# Patient Record
Sex: FEMALE | Race: WHITE | NOT HISPANIC OR LATINO | URBAN - METROPOLITAN AREA
[De-identification: names, ages, dates, MRNs, and addresses within clinical notes are randomized per-mention and may not be internally consistent; named-entity substitution may affect disease eponyms.]

---

## 2017-07-05 PROBLEM — Z00.00 ENCOUNTER FOR PREVENTIVE HEALTH EXAMINATION: Status: ACTIVE | Noted: 2017-07-05

## 2017-07-21 ENCOUNTER — OUTPATIENT (OUTPATIENT)
Dept: OUTPATIENT SERVICES | Facility: HOSPITAL | Age: 77
LOS: 1 days | End: 2017-07-21
Payer: COMMERCIAL

## 2017-07-21 ENCOUNTER — TRANSCRIPTION ENCOUNTER (OUTPATIENT)
Age: 77
End: 2017-07-21

## 2017-07-21 VITALS
HEART RATE: 77 BPM | WEIGHT: 142.86 LBS | RESPIRATION RATE: 26 BRPM | SYSTOLIC BLOOD PRESSURE: 125 MMHG | OXYGEN SATURATION: 98 % | HEIGHT: 61 IN | TEMPERATURE: 98 F | DIASTOLIC BLOOD PRESSURE: 74 MMHG

## 2017-07-21 VITALS
RESPIRATION RATE: 10 BRPM | HEART RATE: 67 BPM | OXYGEN SATURATION: 100 % | SYSTOLIC BLOOD PRESSURE: 126 MMHG | DIASTOLIC BLOOD PRESSURE: 69 MMHG

## 2017-07-21 DIAGNOSIS — H25.11 AGE-RELATED NUCLEAR CATARACT, RIGHT EYE: ICD-10-CM

## 2017-07-21 DIAGNOSIS — Z90.710 ACQUIRED ABSENCE OF BOTH CERVIX AND UTERUS: Chronic | ICD-10-CM

## 2017-07-21 DIAGNOSIS — C43.59 MALIGNANT MELANOMA OF OTHER PART OF TRUNK: Chronic | ICD-10-CM

## 2017-07-21 PROCEDURE — 66984 XCAPSL CTRC RMVL W/O ECP: CPT | Mod: RT

## 2017-07-21 NOTE — ASU DISCHARGE PLAN (ADULT/PEDIATRIC). - NOTIFY
Pain not relieved by Medications/Fever greater than 101/Swelling that continues/Persistent Nausea and Vomiting/Bleeding that does not stop

## 2018-09-06 ENCOUNTER — TRANSCRIPTION ENCOUNTER (OUTPATIENT)
Age: 78
End: 2018-09-06

## 2018-09-07 ENCOUNTER — OUTPATIENT (OUTPATIENT)
Dept: OUTPATIENT SERVICES | Facility: HOSPITAL | Age: 78
LOS: 1 days | End: 2018-09-07
Payer: COMMERCIAL

## 2018-09-07 VITALS
DIASTOLIC BLOOD PRESSURE: 62 MMHG | SYSTOLIC BLOOD PRESSURE: 127 MMHG | HEART RATE: 74 BPM | RESPIRATION RATE: 18 BRPM | OXYGEN SATURATION: 98 %

## 2018-09-07 VITALS
HEIGHT: 62 IN | HEART RATE: 66 BPM | WEIGHT: 147.71 LBS | SYSTOLIC BLOOD PRESSURE: 148 MMHG | TEMPERATURE: 99 F | DIASTOLIC BLOOD PRESSURE: 81 MMHG | RESPIRATION RATE: 15 BRPM | OXYGEN SATURATION: 98 %

## 2018-09-07 DIAGNOSIS — Z98.49 CATARACT EXTRACTION STATUS, UNSPECIFIED EYE: Chronic | ICD-10-CM

## 2018-09-07 DIAGNOSIS — Z90.710 ACQUIRED ABSENCE OF BOTH CERVIX AND UTERUS: Chronic | ICD-10-CM

## 2018-09-07 DIAGNOSIS — C43.59 MALIGNANT MELANOMA OF OTHER PART OF TRUNK: Chronic | ICD-10-CM

## 2018-09-07 DIAGNOSIS — H25.12 AGE-RELATED NUCLEAR CATARACT, LEFT EYE: ICD-10-CM

## 2018-09-07 PROCEDURE — 66984 XCAPSL CTRC RMVL W/O ECP: CPT | Mod: LT

## 2018-09-07 PROCEDURE — C1780: CPT

## 2019-01-11 NOTE — ASU PREOP CHECKLIST - AS BP NONINV SITE
Avoid refined flours (including wheat products), refined sugars and sodas, and refined vegetable oils. Only consume whole grains like rice, quinoa, or gluten free oats; use honey or stevia for sweetening; and, only use olive oil or coconut oil (extra virgin) for oils. Drink plenty of water. Limit caffeine.     Start a probiotic, OR, eat yogurt daily (ideally plain yogurt with honey as sweetener). Fresh vegetables where you can so you are getting plenty of fiber. Limit fried foods. Avoid junk food and fast food.    Please obtain bloodwork one week prior to returning for office visit.   left upper arm

## 2022-06-16 ENCOUNTER — EMERGENCY (EMERGENCY)
Facility: HOSPITAL | Age: 82
LOS: 1 days | Discharge: HOME | End: 2022-06-16
Attending: EMERGENCY MEDICINE
Payer: COMMERCIAL

## 2022-06-16 VITALS
OXYGEN SATURATION: 94 % | RESPIRATION RATE: 22 BRPM | DIASTOLIC BLOOD PRESSURE: 86 MMHG | SYSTOLIC BLOOD PRESSURE: 133 MMHG | TEMPERATURE: 103 F | HEART RATE: 115 BPM | HEIGHT: 62 IN | WEIGHT: 130.07 LBS

## 2022-06-16 VITALS
RESPIRATION RATE: 18 BRPM | SYSTOLIC BLOOD PRESSURE: 117 MMHG | DIASTOLIC BLOOD PRESSURE: 65 MMHG | TEMPERATURE: 99 F | HEART RATE: 98 BPM | OXYGEN SATURATION: 98 %

## 2022-06-16 DIAGNOSIS — C43.59 MALIGNANT MELANOMA OF OTHER PART OF TRUNK: Chronic | ICD-10-CM

## 2022-06-16 DIAGNOSIS — Z98.49 CATARACT EXTRACTION STATUS, UNSPECIFIED EYE: Chronic | ICD-10-CM

## 2022-06-16 DIAGNOSIS — Z90.710 ACQUIRED ABSENCE OF BOTH CERVIX AND UTERUS: Chronic | ICD-10-CM

## 2022-06-16 LAB
ALBUMIN SERPL ELPH-MCNC: 4.2 G/DL — SIGNIFICANT CHANGE UP (ref 3.3–5)
ALP SERPL-CCNC: 68 U/L — SIGNIFICANT CHANGE UP (ref 40–120)
ALT FLD-CCNC: 35 U/L — SIGNIFICANT CHANGE UP (ref 10–45)
ANION GAP SERPL CALC-SCNC: 13 MMOL/L — SIGNIFICANT CHANGE UP (ref 5–17)
AST SERPL-CCNC: 34 U/L — SIGNIFICANT CHANGE UP (ref 10–40)
BASOPHILS # BLD AUTO: 0 K/UL — SIGNIFICANT CHANGE UP (ref 0–0.2)
BASOPHILS NFR BLD AUTO: 0 % — SIGNIFICANT CHANGE UP (ref 0–2)
BILIRUB SERPL-MCNC: 1.3 MG/DL — HIGH (ref 0.2–1.2)
BUN SERPL-MCNC: 16 MG/DL — SIGNIFICANT CHANGE UP (ref 7–23)
CALCIUM SERPL-MCNC: 8.6 MG/DL — SIGNIFICANT CHANGE UP (ref 8.4–10.5)
CHLORIDE SERPL-SCNC: 99 MMOL/L — SIGNIFICANT CHANGE UP (ref 96–108)
CO2 SERPL-SCNC: 25 MMOL/L — SIGNIFICANT CHANGE UP (ref 22–31)
CREAT SERPL-MCNC: 0.73 MG/DL — SIGNIFICANT CHANGE UP (ref 0.5–1.3)
EGFR: 83 ML/MIN/1.73M2 — SIGNIFICANT CHANGE UP
EOSINOPHIL # BLD AUTO: 0 K/UL — SIGNIFICANT CHANGE UP (ref 0–0.5)
EOSINOPHIL NFR BLD AUTO: 0 % — SIGNIFICANT CHANGE UP (ref 0–6)
GLUCOSE SERPL-MCNC: 123 MG/DL — HIGH (ref 70–99)
HCT VFR BLD CALC: 40.1 % — SIGNIFICANT CHANGE UP (ref 34.5–45)
HGB BLD-MCNC: 13 G/DL — SIGNIFICANT CHANGE UP (ref 11.5–15.5)
LYMPHOCYTES # BLD AUTO: 0.36 K/UL — LOW (ref 1–3.3)
LYMPHOCYTES # BLD AUTO: 3.5 % — LOW (ref 13–44)
MANUAL SMEAR VERIFICATION: SIGNIFICANT CHANGE UP
MCHC RBC-ENTMCNC: 29 PG — SIGNIFICANT CHANGE UP (ref 27–34)
MCHC RBC-ENTMCNC: 32.4 GM/DL — SIGNIFICANT CHANGE UP (ref 32–36)
MCV RBC AUTO: 89.5 FL — SIGNIFICANT CHANGE UP (ref 80–100)
MONOCYTES # BLD AUTO: 0.82 K/UL — SIGNIFICANT CHANGE UP (ref 0–0.9)
MONOCYTES NFR BLD AUTO: 8 % — SIGNIFICANT CHANGE UP (ref 2–14)
NEUTROPHILS # BLD AUTO: 9.03 K/UL — HIGH (ref 1.8–7.4)
NEUTROPHILS NFR BLD AUTO: 73.5 % — SIGNIFICANT CHANGE UP (ref 43–77)
NEUTS BAND # BLD: 15 % — HIGH (ref 0–8)
PLAT MORPH BLD: NORMAL — SIGNIFICANT CHANGE UP
PLATELET # BLD AUTO: 205 K/UL — SIGNIFICANT CHANGE UP (ref 150–400)
POTASSIUM SERPL-MCNC: 4.2 MMOL/L — SIGNIFICANT CHANGE UP (ref 3.5–5.3)
POTASSIUM SERPL-SCNC: 4.2 MMOL/L — SIGNIFICANT CHANGE UP (ref 3.5–5.3)
PROT SERPL-MCNC: 7 G/DL — SIGNIFICANT CHANGE UP (ref 6–8.3)
RBC # BLD: 4.48 M/UL — SIGNIFICANT CHANGE UP (ref 3.8–5.2)
RBC # FLD: 13.4 % — SIGNIFICANT CHANGE UP (ref 10.3–14.5)
RBC BLD AUTO: SIGNIFICANT CHANGE UP
SODIUM SERPL-SCNC: 137 MMOL/L — SIGNIFICANT CHANGE UP (ref 135–145)
WBC # BLD: 10.2 K/UL — SIGNIFICANT CHANGE UP (ref 3.8–10.5)
WBC # FLD AUTO: 10.2 K/UL — SIGNIFICANT CHANGE UP (ref 3.8–10.5)

## 2022-06-16 PROCEDURE — 80053 COMPREHEN METABOLIC PANEL: CPT

## 2022-06-16 PROCEDURE — 85025 COMPLETE CBC W/AUTO DIFF WBC: CPT

## 2022-06-16 PROCEDURE — 99284 EMERGENCY DEPT VISIT MOD MDM: CPT

## 2022-06-16 PROCEDURE — 71045 X-RAY EXAM CHEST 1 VIEW: CPT

## 2022-06-16 PROCEDURE — 99285 EMERGENCY DEPT VISIT HI MDM: CPT | Mod: 25

## 2022-06-16 PROCEDURE — 71045 X-RAY EXAM CHEST 1 VIEW: CPT | Mod: 26

## 2022-06-16 PROCEDURE — U0003: CPT

## 2022-06-16 PROCEDURE — U0005: CPT

## 2022-06-16 RX ORDER — IBUPROFEN 200 MG
400 TABLET ORAL ONCE
Refills: 0 | Status: COMPLETED | OUTPATIENT
Start: 2022-06-16 | End: 2022-06-16

## 2022-06-16 RX ADMIN — Medication 400 MILLIGRAM(S): at 17:56

## 2022-06-16 NOTE — ED ADULT NURSE NOTE - OBJECTIVE STATEMENT
Pt is an 81 y/o female instructed to come to the ED by outpatient provider to r/o pneumonia. Pt is covid+ and c/o cough and weakness,  states her o2 sat at office was 92%. Pt states she feels well, states she does not feel she needs to be in hospital. Denies CP, SOB, N/V/D, numbness, tingling, chills, dizziness, headache. A&Ox3. Breathing unlabored and spontaneous. Abdomen soft, nontender, nondistended. Full ROM and strength of extremities. Skin dry and intact. Safety and comfort measures provided, call bell provided to pt and bed in lowest position.

## 2022-06-16 NOTE — ED PROVIDER NOTE - PHYSICAL EXAMINATION
GENERAL: A&Ox3, non-toxic appearing, no acute distress  HEENT: NCAT, EOMI, oral mucosa moist, normal conjunctiva  RESP: CTAB, no respiratory distress, no wheezes/rhonchi/rales, speaking in full sentences, ambulatory SpO2 95%  CV: RRR, no murmurs/rubs/gallops  ABDOMEN: soft, non-tender, non-distended, no guarding  MSK: no visible deformities  NEURO: no focal sensory or motor deficits, CN 2-12 grossly intact  SKIN: warm, normal color, well perfused, no rash  PSYCH: normal affect

## 2022-06-16 NOTE — ED PROVIDER NOTE - NS ED ROS FT
GENERAL: +fever, no chills  EYES: no change in vision, no irritation, no discharge, no redness, no pain  HEENT: no trouble swallowing or speaking, no throat pain, no ear pain  CARDIAC: no chest pain, no palpitations   PULMONARY: +cough, no shortness of breath, no wheezing  GI: no abdominal pain, no nausea, no vomiting, no diarrhea, no constipation  : no changes in urination, no dysuria  SKIN: no rashes  NEURO: no headache, no numbness, no weakness  MSK: no joint pain, no muscle pain, no back pain, no calf pain

## 2022-06-16 NOTE — ED CLERICAL - NS ED CLERK NOTE PRE-ARRIVAL INFORMATION; ADDITIONAL PRE-ARRIVAL INFORMATION
CC/Reason For referral: Hypoxia, Pneumonia, COVID POS  Preferred Consultant(if applicable): N/A  Who admits for you (if needed): N/A  Do you have documents you would like to fax over? No  Would you still like to speak to an ED attending? Yes, please call Dr. Burris after patient is seen

## 2022-06-16 NOTE — ED ADULT NURSE NOTE - NSSEPSISSUSPECTED_ED_A_ED
Patient aware that per the last note Dr. Reyes was wanting to continue current regimen and recheck BP again in 1 month. Patient going to be needing refill of Lisinopril. Would like sent to mail order pharmacy. This has been done. Patient has no other questions/concerns at this time.   
patient would like call back  She is asking if she is to stay on same dose of lisinopril 10 mg  She about 1 to 2 weeks left   Please call her  
No

## 2022-06-16 NOTE — ED ADULT TRIAGE NOTE - PAIN: PRESENCE, MLM
Bed: ED06  Expected date:   Expected time:   Means of arrival:   Comments:  triage   denies pain/discomfort

## 2022-06-16 NOTE — ED POST DISCHARGE NOTE - ADDITIONAL DOCUMENTATION
Attending MD Andujar: As above, +15% bandemia.  Discussed with patient/family.  Triage/Charge made aware to expedite patient when returns.

## 2022-06-16 NOTE — ED POST DISCHARGE NOTE - DETAILS
spoke to pt and her  - verbalized understanding of lab results, coming back to hospital for abx, cultures, admission.

## 2022-06-16 NOTE — ED PROVIDER NOTE - CLINICAL SUMMARY MEDICAL DECISION MAKING FREE TEXT BOX
Guanaco Tejada, PGY3: 82 year old female p/w cough in the setting of COVID+ today, vaccinated x3. Non-hypoxic on exam, lungs clear, abd soft ntnd, no edema. Will obtain labs and cxr, discuss w/ sending physician, reassess for dispo. Patient would be candidate for Paxlovid or MAB if discharged.

## 2022-06-16 NOTE — ED PROVIDER NOTE - NSFOLLOWUPINSTRUCTIONS_ED_ALL_ED_FT
- Lab and imaging results, if performed, were discussed with you along with your discharge diagnosis    - You will receive a call regarding setting up an appointment for Monoclonal Antibody Infusion if you choose to do so.     - Follow up with your doctor in 1 week - bring copies of your results if you were given. If you do not have a primary doctor, please call [phone] to find one convenient for you    - Return to the ED for any new, worsening, or concerning symptoms to you    - Continue all prescribed medications    - Take ibuprofen/tylenol as directed as needed for pain    - Rest and keep yourself hydrated with fluids       COVID-19 (Coronavirus Disease 2019)    WHAT YOU NEED TO KNOW:    What do I need to know about coronavirus disease 2019 (COVID-19)? COVID-19 is the disease caused by the novel (new) coronavirus first discovered in December 2019. Coronaviruses generally cause upper respiratory (nose, throat, and lung) infections, such as a cold. The new virus can also cause serious lower respiratory conditions, such as pneumonia or acute respiratory distress syndrome (ARDS). Anyone can develop serious problems from the new virus, but your risk is higher if you are 65 or older. A weak immune system, diabetes, or a heart or lung condition can also increase your risk.    What are the signs and symptoms of COVID-19? You may not develop any signs or symptoms. Signs and symptoms that do develop usually start about 5 days after infection but can take 2 to 14 days. Signs and symptoms range from mild to severe. You may feel like you have the flu or a bad cold. Information on COVID-19 is still being learned. Tell your healthcare provider if you think you were infected but develop signs or symptoms not listed below:  •A cough  •Shortness of breath or trouble breathing that may become severe  •A fever of at least 100.4°F, or 38°C (may be lower in adults 65 or older)  •Chills that might include shaking  •Muscle pain, body aches, or a headache  •A sore throat  •Suddenly not being able to taste or smell anything  •Feeling mentally and physically tired (fatigue)  •Congestion (stuffy head and nose), or a runny nose.  •Diarrhea, nausea, or vomiting    How is COVID-19 diagnosed? If you think you have COVID-19, call your healthcare provider. In some areas, testing is only done if a person has severe symptoms or is hospitalized. Testing is done more widely in other places. Your provider will tell you what to do based on your symptoms and the rules in your area. In general, the following may be used:   •A viral test shows if you have a current infection. Samples are taken from your nose and throat, usually with swabs. You may need to wait several days to get the test results. Your healthcare provider will tell you how to get your results. You will need to quarantine (stay physically away from others) until you get your results. If results show you have COVID-19, you will need to quarantine until you are well. Your provider or other health official may give you more directions. You will also need to prevent another infection until it is known if you can get COVID-19 again.  •An antibody test shows if you had a past infection. Blood samples are used for this test. Antibodies are made by your immune system to attack the virus that causes COVID-19. Antibodies will form 1 to 3 weeks after you are infected. It is not known if antibodies prevent a second infection, or for how long a person might be protected. If you have antibodies, you will still need to be careful around others until more is known.  •CT scans or x-rays may be used to check for signs of pneumonia. The 2019 coronavirus causes a specific kind of pneumonia, usually in both lungs.      How is COVID-19 treated? No medicine or specific treatment is currently approved for COVID-19. The following may be used to manage your symptoms or treat the effects of COVID-19:   •Mild symptoms may get better on their own. If you do not need to be treated in a hospital, you will be given instructions to use at home. Your condition will be closely monitored. You will need to watch for worsening symptoms and seek immediate care if needed. Talk to your healthcare provider about the following:?Relieve your symptoms. To soothe a sore throat, gargle with warm salt water, or use throat lozenges or a throat spray. Your healthcare provider may recommend a cough medicine. Drink more liquids to thin and loosen mucus and to prevent dehydration. Use decongestants or saline drops as directed for nasal congestion.  ?NSAIDs or acetaminophen can help lower a fever and relieve body aches or a headache. Follow directions. If not taken correctly, NSAIDs can cause kidney damage and acetaminophen can cause liver damage.    •Severe or life-threatening symptoms are treated in the hospital. You may need a combination of the following:?Medicines may be given to reduce inflammation or to fight the virus. You may also need blood thinners to prevent or treat blood clots. If you have a deep vein thrombosis (DVT) or pulmonary embolism (PE), you may need to keep using blood thinners for 3 months.  ?Extra oxygen may be given if you have respiratory failure. This means your lungs cannot get enough oxygen into your blood and out to your organs. Extra oxygen can help prevent organ failure.  ?A ventilator may be used to help you breathe.  ?Convalescent plasma (part of blood) from a patient who has recovered from COVID-19 may be used. The plasma contains antibodies that can help your body fight the infection. Convalescent plasma is only given to patients who have severe signs and symptoms.        How does the 2019 coronavirus spread? The virus spreads quickly and easily. You can become infected if you are in contact with a large amount of the virus, even for a short time. You can also become infected by being around a small amount of virus for a long time. The following are ways the virus is thought to spread, but more information may be coming:   •Droplets are the most common way all coronaviruses spread. The virus can travel in droplets that form when a person talks, coughs, or sneezes. Anyone who breathes in the droplets or gets them in his or her eyes can become infected with the virus. Close personal contact with an infected person is thought to be the main way the virus spreads. Close personal contact means you are within 6 feet (2 meters) of the person.  •Person-to-person contact can spread the virus. For example, a person with the virus on his or her hands can spread it by shaking hands with someone. At this time, it does not appear that the virus can be passed to a baby during pregnancy or delivery. The baby can be infected after he or she is born through person-to-person contact. The virus also does not appear to spread in breast milk. If you are pregnant or breastfeeding, talk to your healthcare provider or obstetrician about any concerns you have.  •The virus can stay on objects and surfaces. A person can get the virus on his or her hands by touching the object or surface. Infection happens if the person then touches his or her eyes or mouth with unwashed hands. It is not yet known how long the virus can stay on an object or surface. That is why it is important to clean all surfaces that are used regularly.  •An infected animal may be able to infect a person who touches it. This may happen at live markets or on a farm.      How can everyone lower the risk for COVID-19? The best way to prevent infection is to avoid anyone who is infected, but this can be hard to do. An infected person can spread the virus before signs or symptoms begin, or even if signs or symptoms never develop. The following can help lower the risk for infection:   Limit the Spread of Infectious Disease    •Wash your hands often throughout the day. Use soap and water. Rub your soapy hands together, lacing your fingers. Wash the front and back of each hand, and in between your fingers. Use the fingers of one hand to scrub under the fingernails of the other hand. Wash for at least 20 seconds. Rinse with warm, running water for several seconds. Then dry your hands with a clean towel or paper towel. Use hand  that contains alcohol if soap and water are not available. Do not touch your eyes, nose, or mouth without washing your hands first. Teach children how to wash their hands and use hand .  Handwashing  •Cover a sneeze or cough. This prevents droplets from traveling from you to others. Turn your face away and cover your mouth and nose with a tissue. Throw the tissue away. Use the bend of your arm if a tissue is not available. Then wash your hands well with soap and water or use hand . Turn and cover your face if you are around someone who is sneezing or coughing. Teach children how to cover a cough or sneeze.  •Follow worldwide, national, and local social distancing guidelines. Social distancing means people avoid close physical contact so the virus cannot spread from one person to another. Keep at least 6 feet (2 meters) between you and others. Also keep this distance from anyone who comes to your home, such as someone making a delivery.  •Make a habit of not touching your face. It is not known how long the virus can stay on objects and surfaces. If you get the virus on your hands, you can transfer it to your eyes, nose, or mouth and become infected. You can also transfer it to objects, surfaces, or people. Be aware of what you touch when you go out. Examples include handrails and elevator buttons. Try not to touch anything with bare hands unless it is necessary. Wash your hands before you leave your home and when you return.  •Clean and disinfect high-touch surfaces and objects often. Use a disinfecting solution or wipes. You can make a solution by diluting 4 teaspoons of bleach in 1 quart (4 cups) of water. Clean and disinfect even if you think no one living in or coming to your home is infected with the virus. You can wipe items with a disinfecting cloth before you bring them into your home. Wash your hands after you handle anything you bring into your home.  •Make your immune system as healthy as possible. A weakened immune system makes you more vulnerable to the new coronavirus. No COVID-19 vaccine is available yet. Vaccines such as the flu and pneumonia vaccines can help your immune system. Your healthcare provider can tell you which vaccines to get, and when to get them. Keep your immune system as strong as possible. Do not smoke. Eat healthy foods, exercise regularly, and try to manage stress. Go to bed and wake up at the same times each day.        How do I follow social distancing guidelines to help lower the risk for COVID-19? National and local social distancing rules vary. Rules may change over time as restrictions are lifted. Restrictions may return if an outbreak happens where you live. It is important to know and follow all current social distancing rules in your area. The following are general guidelines:  •Limit trips out of your home. You may be able to have food, medicines, and other supplies delivered. If possible, have delivered items left at your door or other area. Try not to have someone hand you an item. You will be so close to the person that the virus can spread between you.  •Do not have close physical contact with anyone who does not live in your home. Do not shake hands with, hug, or kiss a person as a greeting. Stand or walk as far from others as possible. If you must use public transportation (such as a bus or subway), try to sit or stand away from others. You can stay safely connected with others through phone calls, e-mail messages, social media websites, and video chats. Check in on anyone who may be having a hard time socially distancing, or who lives alone. Ask administrators at nursing homes or long-term care facilities how you can safely communicate with someone living there.  •Wear a cloth face covering around others who do not live in your home. Face coverings help prevent the virus from spreading to others in droplets. You can use a clear face covering if someone needs to read your lips. This is a cloth covering that has plastic over the mouth area so your lips can be seen. Do not use coverings that have breathing valves or vents. The virus can travel out of the valve or vent and be spread to others. Do not take your covering off to talk, cough, or sneeze. Do not use coverings on children younger than 2 years or on anyone who has breathing problems or cannot remove it.  •Only allow medical or other necessary professionals into your home. Wear your face covering, and remind professionals to wear a face covering. Remind them to wash their hands when they arrive and before they leave. Do not let anyone who does not live in your home in, even if the person is not sick. A person can pass the virus to others before symptoms of COVID-19 begin. Some people never even develop symptoms. Children commonly have mild symptoms or no symptoms. It may be hard to tell a child not to hug or kiss you. Explain that this is how he or she can help you stay healthy.  •Do not go to someone else's home unless it is necessary. Do not go over to visit, even if the person is lonely. Only go if you need to help him or her. Make sure you both wear face coverings while you are there.  •Avoid large gatherings and crowds. Gatherings or crowds of 10 or more individuals can cause the virus to spread. Examples of gatherings include parties, sporting events, [employer] services, and conferences. Crowds may form at beaches, samaniego, and tourist attractions. Protect yourself by staying away from large gatherings and crowds.  •Ask your healthcare provider for other ways to have appointments. You may be able to have appointments without having to go into the provider's office. Some providers offer phone, video, or other types of appointments. You may also be able to get prescriptions for a few months of your medicines at a time.  •Stay safe if you must go out to work. You may have a job that can only be done outside your home. Keep physical distance between you and other workers as much as possible. Follow your employer's rules so everyone stays safe.      What should I do if I have COVID-19 and am recovering at home? Healthcare providers will give you specific instructions to follow. The following are general guidelines to remind you how to keep others safe until you are well:   •Wash your hands often. Use soap and water as much as possible. You can use hand  that contains alcohol if soap and water are not available. Do not share towels with anyone. If you use paper towels, throw them away in a lined trash can kept in your room or area. Use a covered trash can, if possible.  •Do not go out of your home unless it is necessary. You may have to go to your healthcare provider's office for check-ups or to get prescription refills. Do not arrive at the provider's office without an appointment. Providers have to make their offices safe for staff and other patients.  •Do not have close physical contact with anyone unless it is necessary. Only have close physical contact with a person giving direct care, or a baby or child you must care for. Family members and friends should not visit you. If possible, stay in a separate area or room of your home if you live with others. No one should go into the area or room except to give you care. You can visit with others by phone, video chat, e-mail, or similar systems. It is important to stay connected with others in your life while you recover.  •Wear a face covering while others are near you. This can help prevent droplets from spreading the virus when you talk, sneeze, or cough. Put the covering on before anyone comes into your room or area. Remind the person to cover his or her nose and mouth before going in to provide care for you.  •Do not share items. Do not share dishes, towels, or other items with anyone. Items need to be washed after you use them.  •Protect your baby. Wash your hands with soap and water often throughout the day. Wear a clean face covering while you breastfeed, or while you express or pump breast milk. If possible, ask someone who is well to care for your baby. You can put breast milk in bottles for the person to use, if needed. Talk to your healthcare provider if you have any questions or concerns about caring for or bonding with your baby. He or she will tell you when to bring your baby in for check-ups and vaccines. He or she will also tell you what to do if you think your baby was infected with the new virus.  •Do not handle live animals. Until more is known, it is best not to touch, play with, or handle live animals. Some animals, including pets, have been infected with the new coronavirus. Do not handle or care for animals until you are well. Care includes feeding, petting, and cuddling your pet. Do not let your pet lick you or share your food. Ask someone who is not infected to take care of your pet, if possible. If you must care for a pet, wear a face covering. Wash your hands before and after you give care.  •Follow directions from your healthcare provider for being around others after you recover. You will need to wait at least 10 days after symptoms first appeared. Then you will need to have no fever for 24 hours without fever medicine, and no other symptoms. A loss of taste or smell may continue for several months. It is considered okay to be around others if this is your only symptom. It is not known for sure if or for how long a recovered person can pass the virus to others. Your provider may give you instructions, such as continuing social distancing or wearing a face covering around others.  How should I take care of someone who has COVID-19? If the person lives in another home, arrange for a time to give care. Remember to bring a few pairs of disposable gloves and a cloth face covering. The following are general guidelines to help you safely care for anyone who has COVID-19:  •Wash your hands often. Wash before and after you go into the person's home, area, or room. Throw paper towels away in a lined trash can that has a lid, if possible.  •Do not allow others to go near the person. No one should come into the person's home unless it is necessary. If possible, the person should be in a separate area or room if he or she lives with others. Keep the room's door shut unless you need to go in or out. Have others call, video chat, or e-mail the person if he or she is feeling well enough. The person may feel lonely if he or she is kept separate for a long period of time. Safe communication can help him or her stay connected to family and friends.  •Make sure the person's room has good air flow. You may be able to open the window if the weather allows. An air conditioner can also be turned on to help air move.  •Contact the person before you go in to give care. Make sure the person is wearing a face covering. Remind him or her to wash his or her hands with soap and water. He or she can use hand  that contains alcohol if soap and water are not available. Put on a face covering before you go in to give care.  •Wear gloves while you give care and clean. Clean items the person uses often. Clean countertops, cooking surfaces, and the fronts and insides of the microwave and refrigerator. Clean the shower, toilet, the area around the toilet, the sink, the area around the sink, and faucets. Gather used laundry or bedding. Wash and dry items on the warmest settings the fabric allows. Wash dishes and silverware in hot, soapy water or in a .  •Anything you throw away needs to go into a lined trash can. When you need to empty the trash, close the open end of the lining and tie it closed. This helps prevent items the virus is on from spilling out of the trash. Remove your gloves and throw them away. Wash your hands.      Where can I find more information?   •Centers for Disease Control and Prevention  [address] [place] [address]  Phone: 1-141.224.5806  Web Address: http://www.cdc.gov    What should I do if I think I or someone I know may be infected? Do the following to protect others:   •If emergency care is needed, tell the  about the possible infection, or call ahead and tell the emergency department.  •Call a healthcare provider for instructions if symptoms are mild. Anyone who may be infected should not arrive without calling first. The provider will need to protect staff members and other patients.  •The person who may be infected needs to wear a face covering while getting medical care. This will help lower the risk of infecting others. Coverings are not used for anyone who is younger than 2 years, has breathing problems, or cannot remove it. The provider can give you instructions for anyone who cannot wear a covering.      Call your local emergency number (911 in the [place]) or an emergency department if:   •You have trouble breathing or shortness of breath at rest.  •You have chest pain or pressure that lasts longer than 5 minutes.  •You become confused or hard to wake.  •Your lips or face are blue.  •You have a fever of 104°F (40°C) or higher.  When should I call my doctor?   •You do not have symptoms of COVID-19 but had close physical contact within 14 days with someone who tested positive.  •You have questions or concerns about your condition or care.      CARE AGREEMENT:  You have the right to help plan your care. Learn about your health condition and how it may be treated. Discuss treatment options with your healthcare providers to decide what care you want to receive. You always have the right to refuse treatment.

## 2022-06-16 NOTE — ED ADULT NURSE NOTE - NSFALLRSKASSESSDT_ED_ALL_ED
Ongoing SW/CM Assessment/Plan of Care Note     See SW/CM flowsheets for goals and other objective data.    Patient/Family discharge goal (s):  Goal #1: Communication facilitated  Goal #2: Psychosocial needs assessed  Goal #3: Transportation arranged or issues addressed    PT Recommendation:  Recommendation for Discharge: PT: Sub-acute nursing home    OT Recommendation:  Recommendations for Discharge: OT: Post acute therapy, SNF, Less intensive rehab    SLP Recommendation:  Recommendations for Discharge: SLP: None at this time.     Disposition:  Planned Discharge Destination: Arrangements in progress    Progress note:   Patient's son, Hai ([phone]) called this SW and indicted that patient and patient's  contacted him regarding [facility]. They have chosen [facility] on [address] SW inquired how patient will be transported to [facility] upon discharge. Hai indicated that patient is weaker and not sure if patient's  can transport patient. SW explained that [facility] may not be able to provide same day transportation. SW provided private pay options such as [facility] and [facility]. Hai indicated that may be an option and for SW to contact his dad to confirm his preference.     SW left message for Beba at [facility] (T: [phone], F: [phone]) indicating that patient and family have chosen their facility. SW requested call back to confirm if [facility] could admit patient today.     SW called Dariana ([phone]) with [facility] informing her that patient has chosen another facility.       SW updated RN on the above. RN indicated patient is medically stable to be discharged and needs discharge orders.     SW attempted to call patient's  to discuss transportation and discharge planning needs. There was no answer and no ability to leave a message. SW to continue to try to reach patient's .     SW to continue to follow to resume OP HD, HD  transportation, and admission to [facility].        16-Jun-2022 18:28

## 2022-06-16 NOTE — ED PROVIDER NOTE - OBJECTIVE STATEMENT
82 year old female no PMH presents to ED for COVID. Patient states she had breast imaging performed on Monday and the room was cold, she later developed the sniffles. Last night, patient had vigorous coughing and tested negative for COVID on home test. Today had worsening cough, fever, and was "groggy" per the . She went to Dr. Burris's office with +COVID test and cxr with ?pna. Patient sent to ED for further evaluation. Patient did not take Tylenol or NSAIDs today. She is vaccinated x3.

## 2022-06-16 NOTE — ED PROVIDER NOTE - NSICDXPASTSURGICALHX_GEN_ALL_CORE_FT
PAST SURGICAL HISTORY:  Melanoma of back S/p removal    S/P hysterectomy     Status post cataract extraction OD

## 2022-06-16 NOTE — ED PROVIDER NOTE - PATIENT PORTAL LINK FT
You can access the FollowMyHealth Patient Portal offered by [facility] by registering at the following website: [email or web address]. By joining [facility]’s FollowMyHealth portal, you will also be able to view your health information using other applications (apps) compatible with our system.

## 2022-06-17 ENCOUNTER — INPATIENT (INPATIENT)
Facility: HOSPITAL | Age: 82
LOS: 0 days | Discharge: HOME | DRG: 177 | End: 2022-06-18
Attending: INTERNAL MEDICINE | Admitting: INTERNAL MEDICINE
Payer: COMMERCIAL

## 2022-06-17 VITALS
HEART RATE: 109 BPM | SYSTOLIC BLOOD PRESSURE: 110 MMHG | OXYGEN SATURATION: 96 % | DIASTOLIC BLOOD PRESSURE: 67 MMHG | WEIGHT: 125 LBS | HEIGHT: 62 IN | RESPIRATION RATE: 20 BRPM | TEMPERATURE: 97 F

## 2022-06-17 DIAGNOSIS — Z29.9 ENCOUNTER FOR PROPHYLACTIC MEASURES, UNSPECIFIED: ICD-10-CM

## 2022-06-17 DIAGNOSIS — Z90.710 ACQUIRED ABSENCE OF BOTH CERVIX AND UTERUS: Chronic | ICD-10-CM

## 2022-06-17 DIAGNOSIS — U07.1 COVID-19: ICD-10-CM

## 2022-06-17 DIAGNOSIS — Z98.49 CATARACT EXTRACTION STATUS, UNSPECIFIED EYE: Chronic | ICD-10-CM

## 2022-06-17 DIAGNOSIS — N17.9 ACUTE KIDNEY FAILURE, UNSPECIFIED: ICD-10-CM

## 2022-06-17 DIAGNOSIS — C43.59 MALIGNANT MELANOMA OF OTHER PART OF TRUNK: Chronic | ICD-10-CM

## 2022-06-17 PROBLEM — H40.9 UNSPECIFIED GLAUCOMA: Chronic | Status: ACTIVE | Noted: 2018-09-07

## 2022-06-17 LAB
ALBUMIN SERPL ELPH-MCNC: 4 G/DL — SIGNIFICANT CHANGE UP (ref 3.3–5)
ALP SERPL-CCNC: 67 U/L — SIGNIFICANT CHANGE UP (ref 40–120)
ALT FLD-CCNC: 30 U/L — SIGNIFICANT CHANGE UP (ref 10–45)
ANION GAP SERPL CALC-SCNC: 15 MMOL/L — SIGNIFICANT CHANGE UP (ref 5–17)
AST SERPL-CCNC: 27 U/L — SIGNIFICANT CHANGE UP (ref 10–40)
BASOPHILS # BLD AUTO: 0 K/UL — SIGNIFICANT CHANGE UP (ref 0–0.2)
BASOPHILS NFR BLD AUTO: 0 % — SIGNIFICANT CHANGE UP (ref 0–2)
BILIRUB SERPL-MCNC: 1 MG/DL — SIGNIFICANT CHANGE UP (ref 0.2–1.2)
BUN SERPL-MCNC: 32 MG/DL — HIGH (ref 7–23)
CALCIUM SERPL-MCNC: 8.9 MG/DL — SIGNIFICANT CHANGE UP (ref 8.4–10.5)
CHLORIDE SERPL-SCNC: 100 MMOL/L — SIGNIFICANT CHANGE UP (ref 96–108)
CO2 SERPL-SCNC: 26 MMOL/L — SIGNIFICANT CHANGE UP (ref 22–31)
CREAT SERPL-MCNC: 1.08 MG/DL — SIGNIFICANT CHANGE UP (ref 0.5–1.3)
EGFR: 52 ML/MIN/1.73M2 — LOW
ELLIPTOCYTES BLD QL SMEAR: SLIGHT — SIGNIFICANT CHANGE UP
EOSINOPHIL # BLD AUTO: 0 K/UL — SIGNIFICANT CHANGE UP (ref 0–0.5)
EOSINOPHIL NFR BLD AUTO: 0 % — SIGNIFICANT CHANGE UP (ref 0–6)
GLUCOSE SERPL-MCNC: 98 MG/DL — SIGNIFICANT CHANGE UP (ref 70–99)
HCT VFR BLD CALC: 39.2 % — SIGNIFICANT CHANGE UP (ref 34.5–45)
HGB BLD-MCNC: 12.6 G/DL — SIGNIFICANT CHANGE UP (ref 11.5–15.5)
LYMPHOCYTES # BLD AUTO: 0.58 K/UL — LOW (ref 1–3.3)
LYMPHOCYTES # BLD AUTO: 6.2 % — LOW (ref 13–44)
MANUAL SMEAR VERIFICATION: SIGNIFICANT CHANGE UP
MCHC RBC-ENTMCNC: 28.8 PG — SIGNIFICANT CHANGE UP (ref 27–34)
MCHC RBC-ENTMCNC: 32.1 GM/DL — SIGNIFICANT CHANGE UP (ref 32–36)
MCV RBC AUTO: 89.7 FL — SIGNIFICANT CHANGE UP (ref 80–100)
METAMYELOCYTES # FLD: 0.9 % — HIGH (ref 0–0)
MONOCYTES # BLD AUTO: 0.66 K/UL — SIGNIFICANT CHANGE UP (ref 0–0.9)
MONOCYTES NFR BLD AUTO: 7.1 % — SIGNIFICANT CHANGE UP (ref 2–14)
NEUTROPHILS # BLD AUTO: 7.93 K/UL — HIGH (ref 1.8–7.4)
NEUTROPHILS NFR BLD AUTO: 67 % — SIGNIFICANT CHANGE UP (ref 43–77)
NEUTS BAND # BLD: 17.9 % — HIGH (ref 0–8)
PLAT MORPH BLD: NORMAL — SIGNIFICANT CHANGE UP
PLATELET # BLD AUTO: 205 K/UL — SIGNIFICANT CHANGE UP (ref 150–400)
POIKILOCYTOSIS BLD QL AUTO: SLIGHT — SIGNIFICANT CHANGE UP
POTASSIUM SERPL-MCNC: 4.4 MMOL/L — SIGNIFICANT CHANGE UP (ref 3.5–5.3)
POTASSIUM SERPL-SCNC: 4.4 MMOL/L — SIGNIFICANT CHANGE UP (ref 3.5–5.3)
PROT SERPL-MCNC: 6.7 G/DL — SIGNIFICANT CHANGE UP (ref 6–8.3)
RAPID RVP RESULT: DETECTED
RBC # BLD: 4.37 M/UL — SIGNIFICANT CHANGE UP (ref 3.8–5.2)
RBC # FLD: 13.4 % — SIGNIFICANT CHANGE UP (ref 10.3–14.5)
RBC BLD AUTO: ABNORMAL
SARS-COV-2 RNA SPEC QL NAA+PROBE: DETECTED
SARS-COV-2 RNA SPEC QL NAA+PROBE: DETECTED
SODIUM SERPL-SCNC: 141 MMOL/L — SIGNIFICANT CHANGE UP (ref 135–145)
VARIANT LYMPHS # BLD: 0.9 % — SIGNIFICANT CHANGE UP (ref 0–6)
WBC # BLD: 9.34 K/UL — SIGNIFICANT CHANGE UP (ref 3.8–10.5)
WBC # FLD AUTO: 9.34 K/UL — SIGNIFICANT CHANGE UP (ref 3.8–10.5)

## 2022-06-17 PROCEDURE — 99223 1ST HOSP IP/OBS HIGH 75: CPT

## 2022-06-17 PROCEDURE — 99285 EMERGENCY DEPT VISIT HI MDM: CPT

## 2022-06-17 RX ORDER — REMDESIVIR 5 MG/ML
INJECTION INTRAVENOUS
Refills: 0 | Status: DISCONTINUED | OUTPATIENT
Start: 2022-06-17 | End: 2022-06-17

## 2022-06-17 RX ORDER — CHLORHEXIDINE GLUCONATE 213 G/1000ML
1 SOLUTION TOPICAL DAILY
Refills: 0 | Status: DISCONTINUED | OUTPATIENT
Start: 2022-06-17 | End: 2022-06-18

## 2022-06-17 RX ORDER — BENZOCAINE AND MENTHOL 5; 1 G/100ML; G/100ML
1 LIQUID ORAL ONCE
Refills: 0 | Status: COMPLETED | OUTPATIENT
Start: 2022-06-17 | End: 2022-06-17

## 2022-06-17 RX ORDER — REMDESIVIR 5 MG/ML
100 INJECTION INTRAVENOUS EVERY 24 HOURS
Refills: 0 | Status: DISCONTINUED | OUTPATIENT
Start: 2022-06-18 | End: 2022-06-18

## 2022-06-17 RX ORDER — REMDESIVIR 5 MG/ML
200 INJECTION INTRAVENOUS EVERY 24 HOURS
Refills: 0 | Status: COMPLETED | OUTPATIENT
Start: 2022-06-17 | End: 2022-06-17

## 2022-06-17 RX ORDER — BENZOCAINE AND MENTHOL 5; 1 G/100ML; G/100ML
1 LIQUID ORAL THREE TIMES A DAY
Refills: 0 | Status: DISCONTINUED | OUTPATIENT
Start: 2022-06-17 | End: 2022-06-18

## 2022-06-17 RX ORDER — SODIUM CHLORIDE 9 MG/ML
1000 INJECTION INTRAMUSCULAR; INTRAVENOUS; SUBCUTANEOUS ONCE
Refills: 0 | Status: COMPLETED | OUTPATIENT
Start: 2022-06-17 | End: 2022-06-17

## 2022-06-17 RX ORDER — HEPARIN SODIUM 5000 [USP'U]/ML
5000 INJECTION INTRAVENOUS; SUBCUTANEOUS EVERY 8 HOURS
Refills: 0 | Status: DISCONTINUED | OUTPATIENT
Start: 2022-06-17 | End: 2022-06-18

## 2022-06-17 RX ORDER — RITONAVIR 100 MG/1
100 TABLET, FILM COATED ORAL
Refills: 0 | Status: DISCONTINUED | OUTPATIENT
Start: 2022-06-17 | End: 2022-06-17

## 2022-06-17 RX ORDER — LANOLIN ALCOHOL/MO/W.PET/CERES
3 CREAM (GRAM) TOPICAL AT BEDTIME
Refills: 0 | Status: DISCONTINUED | OUTPATIENT
Start: 2022-06-17 | End: 2022-06-18

## 2022-06-17 RX ADMIN — Medication 3 MILLIGRAM(S): at 21:39

## 2022-06-17 RX ADMIN — REMDESIVIR 500 MILLIGRAM(S): 5 INJECTION INTRAVENOUS at 18:20

## 2022-06-17 RX ADMIN — SODIUM CHLORIDE 1000 MILLILITER(S): 9 INJECTION INTRAMUSCULAR; INTRAVENOUS; SUBCUTANEOUS at 12:17

## 2022-06-17 RX ADMIN — BENZOCAINE AND MENTHOL 1 LOZENGE: 5; 1 LIQUID ORAL at 18:20

## 2022-06-17 RX ADMIN — HEPARIN SODIUM 5000 UNIT(S): 5000 INJECTION INTRAVENOUS; SUBCUTANEOUS at 21:39

## 2022-06-17 NOTE — H&P ADULT - PROBLEM SELECTOR PLAN 1
Patient COVID positive on PCR  Patient tried antivirals at home  Currently saturating well on RA, no fevers this presentation  CXR on 6/16 showed interstitial prominence- atypical PNA vs mild pulm edema  Bandemia to 17% on CBC  ID c/s Patient COVID positive on PCR  Patient tried dose of Molnupiravir at home  Currently saturating well on RA, no fevers this presentation  CXR on 6/16 showed interstitial prominence- atypical PNA vs mild pulm edema  Bandemia to 17% on CBC  Start Paxlovid x 5 days per ID

## 2022-06-17 NOTE — ED ADULT NURSE NOTE - OBJECTIVE STATEMENT
Patient is an 83 y/o female with PMH/PSH of glaucoma, melanoma of back, hysterectomy, cataract extraction presenting to the ED via waiting room with c/o abnormal lab result. Patient was seen in [facility] ED yesterday and tested COVID positive and was sent back to ED today for abnormal lab result. Patient arrives to the ED ambulatory and A&Ox4. Airway patent, breathing unlabored, no accessory muscle use noted, denies SOB. Denies chest pain, peripheral pulses strong, cap refill less than 2 secs. Denies dizziness. Denies n/v/d, denies constipation. Denies difficult/painful/frequent urination. Moving all extremities w/o difficulty. Skin warm/intact. Denies fever, cough, or chills. Patient is an 83 y/o female with PMH/PSH of glaucoma, melanoma of back, hysterectomy, cataract extraction presenting to the ED via waiting room with c/o abnormal lab result. Patient was seen in [facility] ED yesterday and tested COVID positive and was sent back to ED today for abnormal lab result. Patient arrives to the ED ambulatory and A&Ox4. Airway patent, breathing unlabored, no accessory muscle use noted, denies SOB. Denies chest pain, peripheral pulses strong, cap refill less than 2 secs. Denies dizziness. Denies n/v/d, denies constipation. Denies difficult/painful/frequent urination. Moving all extremities w/o difficulty. Skin warm/intact. Denies fever or chills, reports slight cough and sore throat. IV access established, labs sent, fluids administered. Patient is an 83 y/o female with PMH/PSH of glaucoma, melanoma of back, hysterectomy, cataract extraction presenting to the ED via waiting room with c/o abnormal lab result. Patient was seen in [facility] ED yesterday and tested COVID positive and was sent back to ED today for abnormal lab result. Patient arrives to the ED ambulatory and A&Ox3 with some confusion at baseline. Airway patent, breathing unlabored, no accessory muscle use noted, denies SOB. Denies chest pain, peripheral pulses strong, cap refill less than 2 secs. Denies dizziness. Denies n/v/d, denies constipation. Denies difficult/painful/frequent urination. Moving all extremities w/o difficulty. Skin warm/intact. Denies fever or chills, reports slight cough and sore throat. IV access established, labs sent, fluids administered.

## 2022-06-17 NOTE — H&P ADULT - ASSESSMENT
82F no PMHx repeat presentation for COVID and called back for 15% bands on CBC sent in by Dr. Burris, with worsening bands on repeat CBC, admitted to medicine for further management

## 2022-06-17 NOTE — PATIENT PROFILE ADULT - FALL HARM RISK - RISK INTERVENTIONS

## 2022-06-17 NOTE — ED PROVIDER NOTE - ATTENDING CONTRIBUTION TO CARE
Attending MD Nunez.  Agree with above.  Concern for severe viral syndrome vs. known viral syndrome + occult bacterial infection as possible etiology of sig bandemia that was noted on labs following discharge.  PT with continued sxs and generalized malaise.  Case discussed with hospitalist who is to consult ID and in agreement with holding abxs at this time in afebrile pt with known viral syndrome.  Will repeat labs and cultures.

## 2022-06-17 NOTE — CONSULT NOTE ADULT - SUBJECTIVE AND OBJECTIVE BOX
Patient is a 82y old  Female who presents with a chief complaint of   HPI:  81 y/o female with no pmhx repeat presentation for COVID and called back for 15% bands on CBC sent in by Dr. Burris. COVID vaccinated. Tested + yesterday; symptoms of cough, weakness started on tuesday/wednesday. Took 4 doses of husbands antiviral (who has covid) last night without consulting a doctor. No worsening of symptoms since yesterday. No chest pain, palpitations, SOB. Mild sore throat. Daughter also reports that patient might be a little more confused than baseline but very mildly. No fevers/chills, n/v/d, rashes, or changes in urination.    In ED VS T: 98.6, HR: 92 BP: 145/80 (110/67 - 145/80) RR: 16 SpO2: 98% RA  Labs notable for Band neutrophils 17.6, Cr 1.08 (previously 0.73)  Patient given NS 1L bolus     (17 Jun 2022 14:54)      PAST MEDICAL & SURGICAL HISTORY:  Glaucoma      Melanoma of back  S/p removal      S/P hysterectomy      Status post cataract extraction  OD          Social history:    FAMILY HISTORY     Allergic/Immunologic:	No hives or rash   Allergies    sulfa drugs (Rash)    Intolerances    codeine (Vomiting)      Antimicrobials:    ritonavir (EUA) 100 milliGRAM(s) Oral two times a day        Vital Signs Last 24 Hrs  T(C): 37 (17 Jun 2022 13:33), Max: 39.4 (16 Jun 2022 16:33)  T(F): 98.6 (17 Jun 2022 13:33), Max: 103 (16 Jun 2022 18:00)  HR: 92 (17 Jun 2022 13:33) (90 - 115)  BP: 145/80 (17 Jun 2022 13:33) (110/67 - 145/80)  BP(mean): --  RR: 16 (17 Jun 2022 13:33) (16 - 22)  SpO2: 98% (17 Jun 2022 13:33) (94% - 98%)         Eyes:PERRL EOMI.NO discharge or conjunctival injection    ENMT:No sinus tenderness.No thrush.No pharyngeal exudate or erythema.Fair dental hygiene    Neck:Supple,No LN,no JVD      Respiratory:Good air entry bilaterally,CTA    Cardiovascular:S1 S2 wnl, No murmurs,rub or gallops    Gastrointestinal:Soft BS(+) no tenderness no masses ,No rebound or guarding    Genitourinary:No CVA tendereness     Rectal:    Extremities:No cyanosis,clubbing or edema.    Vascular:peripheral pulses felt    Neurological:AAO X 3,No grossly focal deficits    Skin:No rash     Lymph Nodes:No palpable LNs    Musculoskeletal:No joint swelling or LOM    Psychiatric:Affect normal.                                12.6   9.34  )-----------( 205      ( 17 Jun 2022 12:27 )             39.2         06-17    141  |  100  |  32<H>  ----------------------------<  98  4.4   |  26  |  1.08    Ca    8.9      17 Jun 2022 12:27    TPro  6.7  /  Alb  4.0  /  TBili  1.0  /  DBili  x   /  AST  27  /  ALT  30  /  AlkPhos  67  06-17      RECENT CULTURES:      MICROBIOLOGY:          Radiology:      Assessment:        Recommendations and Plan:    Pager [phone]  After 5 pm/weekends or if no response :[phone]

## 2022-06-17 NOTE — H&P ADULT - HISTORY OF PRESENT ILLNESS
81 y/o female with no pmhx repeat presentation for COVID and called back for 15% bands on CBC sent in by Dr. Burris. COVID vaccinated. Tested + yesterday; symptoms of cough, weakness started on tuesday/wednesday. Took 4 doses of husbands antiviral (who has covid) last night without consulting a doctor. No worsening of symptoms since yesterday. No chest pain, palpitations, SOB. Mild sore throat. Daughter also reports that patient might be a little more confused than baseline but very mildly. No fevers/chills, n/v/d, rashes, or changes in urination.    In ED VS T: 98.6, HR: 92 BP: 145/80 (110/67 - 145/80) RR: 16 SpO2: 98% RA  Labs notable for Band neutrophils 17.6, Cr 1.08 (previously 0.73)  Patient given NS 1L bolus     83 y/o female with no PMHx repeat presentation for COVID and called back for 15% bands on CBC sent in by Dr. Burris. COVID vaccinated. Tested + yesterday; symptoms of cough, weakness started on tuesday/wednesday. Took 4 doses of husbands antiviral Molnupiravir (who has covid) last night without consulting a doctor. No worsening of symptoms since yesterday. No chest pain, palpitations, SOB. Mild sore throat. Daughter also reports that patient might be a little more confused than baseline but very mildly. No fevers/chills, n/v/d, rashes, or changes in urination.    In ED VS T: 98.6, HR: 92 BP: 145/80 (110/67 - 145/80) RR: 16 SpO2: 98% RA  Labs notable for Band neutrophils 17.6, Cr 1.08 (previously 0.73)  Patient given NS 1L bolus

## 2022-06-17 NOTE — H&P ADULT - PROBLEM SELECTOR PLAN 2
Cr on 6/16 was 0.73  Cr 1.08 on admission  Likely prerenal i/s/o low PO intake  Trend BMP  avoid nephrotoxins

## 2022-06-17 NOTE — H&P ADULT - NSHPLABSRESULTS_GEN_ALL_CORE
12.6   9.34  )-----------( 205      ( 17 Jun 2022 12:27 )             39.2     Auto Eosinophil # 0.00  / Auto Eosinophil % 0.0   / Auto Neutrophil # 7.93  / Auto Neutrophil % 67.0  / BANDS % 17.9                         13.0   10.20 )-----------( 205      ( 16 Jun 2022 18:15 )             40.1     Auto Eosinophil # 0.00  / Auto Eosinophil % 0.0   / Auto Neutrophil # 9.03  / Auto Neutrophil % 73.5  / BANDS % 15.0     06-17    141  |  100  |  32<H>  ----------------------------<  98  4.4   |  26  |  1.08  06-16    137  |  99  |  16  ----------------------------<  123<H>  4.2   |  25  |  0.73    Ca    8.9      17 Jun 2022 12:27  TPro  6.7  /  Alb  4.0  /  TBili  1.0  /  DBili  x   /  AST  27  /  ALT  30  /  AlkPhos  67  06-17  TPro  7.0  /  Alb  4.2  /  TBili  1.3<H>  /  DBili  x   /  AST  34  /  ALT  35  /  AlkPhos  68  06-16                RESPIRATORY  VENT:    ABG:     VBG:

## 2022-06-17 NOTE — ED PROVIDER NOTE - NS ED ROS FT
Gen: Denies fever, weight loss  CV: Denies chest pain, palpitations  Skin: Denies rash, erythema, color changes  Resp: Denies SOB  Endo: Denies sensitivity to heat, cold, increased urination  GI: Denies diarrhea, constipation, nausea, vomiting  Msk: Denies back pain, LE swelling, extremity pain  : Denies dysuria, increased frequency  Neuro: Denies LOC, weakness

## 2022-06-17 NOTE — ED PROVIDER NOTE - PHYSICAL EXAMINATION
Gen: WDWN, NAD, comfortable appearing, no increased WOB  HEENT: EOMI, no nasal discharge, mucous membranes mildly dry, no oropharyngeal edema/erythema/exudates   CV: RRR, +S1/S2, no M/R/G, equal b/l radial pulses 2+  Resp: CTAB, no W/R/R, SPO2 >95% on RA, no increased WOB   GI: Abdomen soft non-distended, NTTP, no masses/organomegaly   MSK/Skin: No CVA tenderness, no open wounds, no bruising, no LE edema  Neuro: A&Ox3, moving all 4 extremities spontaneously, gross sensation intact in UE and LE BL  Psych: appropriate mood

## 2022-06-17 NOTE — CONSULT NOTE ADULT - ASSESSMENT
82 year old fully vaccinated and boosted presents with mild URI symptoms and mild confusion and found to have Covid  Not on oxygen and non toxic  Admitted to the hospital because of increased in bands  not for covid     Covid: Since she was not admitted for covid, I think we can treat with paxlovid for 5 days     Bands; No signs or symptoms of sepsis and no focal complaints   This could be related to covid, although certainly atypical. Doubt bacterial pna  can watch off antibiotics unless situation changes  ID to cover for problems over weekend

## 2022-06-17 NOTE — ED PROVIDER NOTE - PROGRESS NOTE DETAILS
Jabari, PGY-2  Spoke with admitting Doctor (Dr. Bacon) who reports that she wants to hold abx/steroids at this time; she will consult ID for recommendations based on BC, UA/UC, and RVP sent in ER today.

## 2022-06-17 NOTE — H&P ADULT - NSHPSOCIALHISTORY_GEN_ALL_CORE
Lives with  Worked as a   Smoking:  Alcohol:  Illicit Drugs: Lives with   Denies Smoking  Denies Alcohol  Denies Illicit Drug use

## 2022-06-17 NOTE — ED PROVIDER NOTE - OBJECTIVE STATEMENT
83 y/o female with no pmhx repeat presentation for COVID and called back for 15% bands on CBC sent in by Dr. Burris. COVID vaccinated. Tested + yesterday; symptoms of cough, weakness started on tuesday/wednesday. Took 4 doses of husbands antiviral (who has covid) last night without consulting a doctor. No worsening of symptoms since yesterday. No chest pain, palpitations, SOB. Mild sore throat. Daughter also reports that patient might be a little more confused than baseline but very mildly. No fevers/chills, n/v/d, rashes, or changes in urination.

## 2022-06-17 NOTE — ED PROVIDER NOTE - CLINICAL SUMMARY MEDICAL DECISION MAKING FREE TEXT BOX
81 y/o female with no pmhx repeat presentation for COVID and called back for 15% bands on CBC sent in by Dr. Burris. COVID vaccinated. Tested + yesterday; no worsening of symptoms, no increased WOB/hypoxia, well appearing. Will recheck labs and explore other possible sources of infection; UA/UC, BCx2, RVP and admit; will discuss empiric abx/steroids with admitting hospitalist depending on if other source identified

## 2022-06-17 NOTE — PATIENT PROFILE ADULT - NSPROPTRIGHTNOTIFY_GEN_A_NUR
Pt verbalized understanding of discharge instructions and follow up care. Pt educated on medications and all questions answered. Pt ambulated out of ED with a steady gait.    declines

## 2022-06-18 ENCOUNTER — TRANSCRIPTION ENCOUNTER (OUTPATIENT)
Age: 82
End: 2022-06-18

## 2022-06-18 VITALS
HEART RATE: 77 BPM | DIASTOLIC BLOOD PRESSURE: 72 MMHG | RESPIRATION RATE: 18 BRPM | TEMPERATURE: 98 F | OXYGEN SATURATION: 97 % | SYSTOLIC BLOOD PRESSURE: 132 MMHG

## 2022-06-18 LAB
ALBUMIN SERPL ELPH-MCNC: 3.3 G/DL — SIGNIFICANT CHANGE UP (ref 3.3–5)
ALBUMIN SERPL ELPH-MCNC: 3.5 G/DL — SIGNIFICANT CHANGE UP (ref 3.3–5)
ALP SERPL-CCNC: 59 U/L — SIGNIFICANT CHANGE UP (ref 40–120)
ALP SERPL-CCNC: 60 U/L — SIGNIFICANT CHANGE UP (ref 40–120)
ALT FLD-CCNC: 23 U/L — SIGNIFICANT CHANGE UP (ref 10–45)
ALT FLD-CCNC: 25 U/L — SIGNIFICANT CHANGE UP (ref 10–45)
ANION GAP SERPL CALC-SCNC: 12 MMOL/L — SIGNIFICANT CHANGE UP (ref 5–17)
AST SERPL-CCNC: 22 U/L — SIGNIFICANT CHANGE UP (ref 10–40)
AST SERPL-CCNC: 23 U/L — SIGNIFICANT CHANGE UP (ref 10–40)
BASOPHILS # BLD AUTO: 0 K/UL — SIGNIFICANT CHANGE UP (ref 0–0.2)
BILIRUB DIRECT SERPL-MCNC: 0.1 MG/DL — SIGNIFICANT CHANGE UP (ref 0–0.3)
BILIRUB INDIRECT FLD-MCNC: 0.5 MG/DL — SIGNIFICANT CHANGE UP (ref 0.2–1)
BILIRUB SERPL-MCNC: 0.6 MG/DL — SIGNIFICANT CHANGE UP (ref 0.2–1.2)
BILIRUB SERPL-MCNC: 0.6 MG/DL — SIGNIFICANT CHANGE UP (ref 0.2–1.2)
BUN SERPL-MCNC: 22 MG/DL — SIGNIFICANT CHANGE UP (ref 7–23)
CALCIUM SERPL-MCNC: 8.1 MG/DL — LOW (ref 8.4–10.5)
CHLORIDE SERPL-SCNC: 106 MMOL/L — SIGNIFICANT CHANGE UP (ref 96–108)
CO2 SERPL-SCNC: 22 MMOL/L — SIGNIFICANT CHANGE UP (ref 22–31)
CREAT SERPL-MCNC: 0.69 MG/DL — SIGNIFICANT CHANGE UP (ref 0.5–1.3)
CREAT SERPL-MCNC: 0.72 MG/DL — SIGNIFICANT CHANGE UP (ref 0.5–1.3)
EGFR: 84 ML/MIN/1.73M2 — SIGNIFICANT CHANGE UP
EGFR: 88 ML/MIN/1.73M2 — SIGNIFICANT CHANGE UP
EOSINOPHIL # BLD AUTO: 0 K/UL — SIGNIFICANT CHANGE UP (ref 0–0.5)
GLUCOSE SERPL-MCNC: 94 MG/DL — SIGNIFICANT CHANGE UP (ref 70–99)
HCT VFR BLD CALC: 36.3 % — SIGNIFICANT CHANGE UP (ref 34.5–45)
HGB BLD-MCNC: 11.6 G/DL — SIGNIFICANT CHANGE UP (ref 11.5–15.5)
LYMPHOCYTES # BLD AUTO: 0.89 K/UL — LOW (ref 1–3.3)
LYMPHOCYTES # BLD AUTO: 13 % — SIGNIFICANT CHANGE UP (ref 13–44)
MAGNESIUM SERPL-MCNC: 2 MG/DL — SIGNIFICANT CHANGE UP (ref 1.6–2.6)
MCHC RBC-ENTMCNC: 28.6 PG — SIGNIFICANT CHANGE UP (ref 27–34)
MCHC RBC-ENTMCNC: 32 GM/DL — SIGNIFICANT CHANGE UP (ref 32–36)
MCV RBC AUTO: 89.4 FL — SIGNIFICANT CHANGE UP (ref 80–100)
MONOCYTES # BLD AUTO: 0.57 K/UL — SIGNIFICANT CHANGE UP (ref 0–0.9)
MONOCYTES NFR BLD AUTO: 9 % — SIGNIFICANT CHANGE UP (ref 2–14)
NEUTROPHILS # BLD AUTO: 4.91 K/UL — SIGNIFICANT CHANGE UP (ref 1.8–7.4)
NEUTROPHILS NFR BLD AUTO: 71 % — SIGNIFICANT CHANGE UP (ref 43–77)
NEUTS BAND # BLD: 6 % — SIGNIFICANT CHANGE UP (ref 0–8)
NRBC # BLD: 0 /100 WBCS — SIGNIFICANT CHANGE UP (ref 0–0)
PHOSPHATE SERPL-MCNC: 2.9 MG/DL — SIGNIFICANT CHANGE UP (ref 2.5–4.5)
PLAT MORPH BLD: NORMAL — SIGNIFICANT CHANGE UP
PLATELET # BLD AUTO: 213 K/UL — SIGNIFICANT CHANGE UP (ref 150–400)
POTASSIUM SERPL-MCNC: 3.8 MMOL/L — SIGNIFICANT CHANGE UP (ref 3.5–5.3)
POTASSIUM SERPL-SCNC: 3.8 MMOL/L — SIGNIFICANT CHANGE UP (ref 3.5–5.3)
PROT SERPL-MCNC: 5.9 G/DL — LOW (ref 6–8.3)
PROT SERPL-MCNC: 6.1 G/DL — SIGNIFICANT CHANGE UP (ref 6–8.3)
RBC # BLD: 4.06 M/UL — SIGNIFICANT CHANGE UP (ref 3.8–5.2)
RBC # FLD: 13.4 % — SIGNIFICANT CHANGE UP (ref 10.3–14.5)
RBC BLD AUTO: SIGNIFICANT CHANGE UP
SODIUM SERPL-SCNC: 140 MMOL/L — SIGNIFICANT CHANGE UP (ref 135–145)
VARIANT LYMPHS # BLD: 1 % — SIGNIFICANT CHANGE UP (ref 0–6)
WBC # BLD: 6.37 K/UL — SIGNIFICANT CHANGE UP (ref 3.8–10.5)
WBC # FLD AUTO: 6.37 K/UL — SIGNIFICANT CHANGE UP (ref 3.8–10.5)

## 2022-06-18 PROCEDURE — 87040 BLOOD CULTURE FOR BACTERIA: CPT

## 2022-06-18 PROCEDURE — 84100 ASSAY OF PHOSPHORUS: CPT

## 2022-06-18 PROCEDURE — 83735 ASSAY OF MAGNESIUM: CPT

## 2022-06-18 PROCEDURE — 85025 COMPLETE CBC W/AUTO DIFF WBC: CPT

## 2022-06-18 PROCEDURE — 0225U NFCT DS DNA&RNA 21 SARSCOV2: CPT

## 2022-06-18 PROCEDURE — 99285 EMERGENCY DEPT VISIT HI MDM: CPT

## 2022-06-18 PROCEDURE — 80053 COMPREHEN METABOLIC PANEL: CPT

## 2022-06-18 PROCEDURE — 80076 HEPATIC FUNCTION PANEL: CPT

## 2022-06-18 PROCEDURE — 82565 ASSAY OF CREATININE: CPT

## 2022-06-18 RX ORDER — NIRMATRELVIR AND RITONAVIR 150-100 MG
6 KIT ORAL
Qty: 30 | Refills: 0
Start: 2022-06-18 | End: 2022-06-22

## 2022-06-18 RX ORDER — NIRMATRELVIR AND RITONAVIR 150-100 MG
1 KIT ORAL
Qty: 1 | Refills: 0
Start: 2022-06-18 | End: 2022-06-22

## 2022-06-18 RX ORDER — NIRMATRELVIR AND RITONAVIR 150-100 MG
2 KIT ORAL
Qty: 30 | Refills: 0
Start: 2022-06-18 | End: 2022-06-22

## 2022-06-18 RX ADMIN — BENZOCAINE AND MENTHOL 1 LOZENGE: 5; 1 LIQUID ORAL at 09:07

## 2022-06-18 RX ADMIN — Medication 100 MILLIGRAM(S): at 11:37

## 2022-06-18 RX ADMIN — BENZOCAINE AND MENTHOL 1 LOZENGE: 5; 1 LIQUID ORAL at 05:53

## 2022-06-18 RX ADMIN — HEPARIN SODIUM 5000 UNIT(S): 5000 INJECTION INTRAVENOUS; SUBCUTANEOUS at 05:54

## 2022-06-18 RX ADMIN — Medication 100 MILLIGRAM(S): at 09:07

## 2022-06-18 NOTE — PROGRESS NOTE ADULT - SUBJECTIVE AND OBJECTIVE BOX
DATE OF SERVICE: 06-18-22 @ 06:33    Patient is a 82y old  Female who presents with a chief complaint of bands (17 Jun 2022 15:58)      SUBJECTIVE / OVERNIGHT EVENTS: NAEO. Patient afebrile o/n, saturating well on RA. Denies CP, SOB, fevers/chills. Endorses improvement of sore throat, cough with prns.     MEDICATIONS  (STANDING):  chlorhexidine 2% Cloths 1 Application(s) Topical daily  heparin   Injectable 5000 Unit(s) SubCutaneous every 8 hours  melatonin 3 milliGRAM(s) Oral at bedtime  remdesivir  IVPB 100 milliGRAM(s) IV Intermittent every 24 hours    MEDICATIONS  (PRN):  benzocaine 15 mG/menthol 3.6 mG Lozenge 1 Lozenge Oral three times a day PRN Sore Throat  guaiFENesin Oral Liquid (Sugar-Free) 100 milliGRAM(s) Oral every 6 hours PRN Cough      Vital Signs Last 24 Hrs  T(C): 36.7 (18 Jun 2022 06:08), Max: 37 (17 Jun 2022 13:33)  T(F): 98 (18 Jun 2022 06:08), Max: 98.6 (17 Jun 2022 13:33)  HR: 86 (18 Jun 2022 06:08) (86 - 109)  BP: 105/85 (18 Jun 2022 06:08) (105/85 - 145/80)  BP(mean): --  RR: 18 (18 Jun 2022 06:08) (16 - 20)  SpO2: 94% (18 Jun 2022 06:08) (94% - 98%)  CAPILLARY BLOOD GLUCOSE        I&O's Summary    17 Jun 2022 07:01  -  18 Jun 2022 06:33  --------------------------------------------------------  IN: 50 mL / OUT: 0 mL / NET: 50 mL        PHYSICAL EXAM:  GENERAL: NAD, well-developed  HEAD:  Atraumatic, Normocephalic  EYES: EOMI, PERRLA, conjunctiva and sclera clear  NECK: Supple, No JVD  CHEST/LUNG: Clear to auscultation bilaterally; No wheeze  HEART: Regular rate and rhythm; No murmurs, rubs, or gallops  ABDOMEN: Soft, Nontender, Nondistended; Bowel sounds present  EXTREMITIES:  2+ Peripheral Pulses, No clubbing, cyanosis, or edema  PSYCH: AAOx3  NEUROLOGY: non-focal  SKIN: No rashes or lesions    LABS:                        12.6   9.34  )-----------( 205      ( 17 Jun 2022 12:27 )             39.2     06-17    141  |  100  |  32<H>  ----------------------------<  98  4.4   |  26  |  1.08    Ca    8.9      17 Jun 2022 12:27    TPro  6.7  /  Alb  4.0  /  TBili  1.0  /  DBili  x   /  AST  27  /  ALT  30  /  AlkPhos  67  06-17              RADIOLOGY & ADDITIONAL TESTS:    Imaging Personally Reviewed:    Consultant(s) Notes Reviewed:      Care Discussed with Consultants/Other Providers:

## 2022-06-18 NOTE — DISCHARGE NOTE PROVIDER - NSDCMRMEDTOKEN_GEN_ALL_CORE_FT
benzonatate 100 mg oral capsule: 1 cap(s) orally 3 times a day, As Needed -for cough MDD:3 caps   nirmatrelvir-ritonavir 150 mg-100 mg oral tablet: 6 tab(s) orally once a day MDD:6 tablets  nirmatrelvir-ritonavir 150 mg-100 mg oral tablet: 1 dose(s) orally once a day MDD:1 dose  Paxlovid 150 mg-100 mg oral tablet: 2 tab(s) orally 2 times a day  Onset of symptoms 6/16

## 2022-06-18 NOTE — DISCHARGE NOTE PROVIDER - HOSPITAL COURSE
81 y/o female with no PMHx repeat presentation for COVID and called back for 15% bands on CBC sent in by Dr. Burris. COVID vaccinated. Tested + yesterday; symptoms of cough, weakness started on tuesday/wednesday. Took 4 doses of husbands antiviral Molnupiravir (who has covid) last night without consulting a doctor. No worsening of symptoms since yesterday. No chest pain, palpitations, SOB. Mild sore throat. Daughter also reports that patient might be a little more confused than baseline but very mildly. No fevers/chills, n/v/d, rashes, or changes in urination.    In ED VS T: 98.6, HR: 92 BP: 145/80 (110/67 - 145/80) RR: 16 SpO2: 98% RA  Labs notable for Band neutrophils 17.6, Cr 1.08 (previously 0.73)  Patient given NS 1L bolus    Hospital Course:  Patient was started on Remdesivir. Patient remained normoxic on RA and bandemia resolved. ROSE resolved.   On day of discharge, patient was clinically stable. To follow up with PCP. Patient will complete 5 day course of Paxlovid at home.

## 2022-06-18 NOTE — DISCHARGE NOTE NURSING/CASE MANAGEMENT/SOCIAL WORK - PATIENT PORTAL LINK FT
You can access the FollowMyHealth Patient Portal offered by [facility] by registering at the following website: [email or web address]. By joining [facility]’s FollowMyHealth portal, you will also be able to view your health information using other applications (apps) compatible with our system.

## 2022-06-18 NOTE — PROGRESS NOTE ADULT - PROBLEM SELECTOR PLAN 1
Patient COVID positive on PCR  Patient tried dose of Molnupiravir at home  Currently saturating well on RA, no fevers this presentation  CXR on 6/16 showed interstitial prominence- atypical PNA vs mild pulm edema  Bandemia to 17% on CBC  Trend CBC, LFTs while inpatient   Remdesivir x 3 days per ID while inpatient, can switch to paxlovid outpt   Will send Paxlovid to pharmacy Patient COVID positive on PCR  Patient tried dose of Molnupiravir at home  Currently saturating well on RA, no fevers this presentation  CXR on 6/16 showed interstitial prominence- atypical PNA vs mild pulm edema  Bandemia to 17% on CBC on admission, now resolved  Trend CBC, LFTs while inpatient   Remdesivir x 3 days per ID while inpatient, can switch to paxlovid outpt   Will send Paxlovid to pharmacy

## 2022-06-18 NOTE — PROGRESS NOTE ADULT - PROBLEM SELECTOR PLAN 3
DVT PPx:     Diet: Regular    Dispo: Pending clinical course DVT PPx: SubQ hep    Diet: Regular    Dispo: Pending clinical course

## 2022-06-18 NOTE — DISCHARGE NOTE PROVIDER - NSDCCPCAREPLAN_GEN_ALL_CORE_FT
PRINCIPAL DISCHARGE DIAGNOSIS  Diagnosis: COVID  Assessment and Plan of Treatment: You had been found to have COVID the day before coming to the hospital. You returned to the hospital because you were found to have "bands" in your blood work. These are a type of white blood cell that are increased when there is an infection. Because of that, we treated you with antiviral medications. You never required additional oxygen and your blood work improved. We treated your symptoms of cough with a medication called benzonatate.   Please take the medication Paxlovid as prescribed for 5 days. You can take the benzonatate (tessalon perrles) up to 3 times a day for cough AS NEEDED. Please follow up with your primary care provider within 1 week of discharge.   If you feel chest pain, shortness of breath, notice increased cough with sputum, have worsening fevers/chills, or generally feel unwell please go see your primary care provider or return to the hospital.

## 2022-06-18 NOTE — DISCHARGE NOTE PROVIDER - NSFOLLOWUPCLINICS_GEN_ALL_ED_FT
[facility] [place]  Internal Medicine  [address] [place] [address]  Phone: [phone]  Fax: [phone]

## 2022-06-18 NOTE — DISCHARGE NOTE PROVIDER - NSDCCPTREATMENT_GEN_ALL_CORE_FT
PRINCIPAL PROCEDURE  Procedure: XR chest AP  Findings and Treatment: IMPRESSION:  No focal infiltrates.  Interstitial prominence which may be due to atypical pneumonia or mild   pulmonary edema

## 2022-06-19 ENCOUNTER — TRANSCRIPTION ENCOUNTER (OUTPATIENT)
Age: 82
End: 2022-06-19

## 2022-06-22 LAB
CULTURE RESULTS: SIGNIFICANT CHANGE UP
CULTURE RESULTS: SIGNIFICANT CHANGE UP
SPECIMEN SOURCE: SIGNIFICANT CHANGE UP
SPECIMEN SOURCE: SIGNIFICANT CHANGE UP

## 2022-06-30 ENCOUNTER — APPOINTMENT (OUTPATIENT)
Age: 82
End: 2022-06-30

## 2022-06-30 PROCEDURE — 93970 EXTREMITY STUDY: CPT

## 2022-08-01 ENCOUNTER — APPOINTMENT (OUTPATIENT)
Dept: VASCULAR SURGERY | Facility: CLINIC | Age: 82
End: 2022-08-01

## 2022-08-01 VITALS
HEIGHT: 61 IN | DIASTOLIC BLOOD PRESSURE: 81 MMHG | TEMPERATURE: 97.6 F | SYSTOLIC BLOOD PRESSURE: 128 MMHG | HEART RATE: 64 BPM | BODY MASS INDEX: 24.55 KG/M2 | WEIGHT: 130 LBS

## 2022-08-01 PROCEDURE — 99203 OFFICE O/P NEW LOW 30 MIN: CPT

## 2022-08-01 NOTE — ASSESSMENT
[FreeTextEntry1] : In summary, Mrs. 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 I advised her that no treatment was necessary. I instructed her to contact me if she develops and lower extremity edema or pain.

## 2022-08-01 NOTE — HISTORY OF PRESENT ILLNESS
[FreeTextEntry1] : Mrs. Rojas is a ranjan 82-year old lady who presents with a history of chronic right gastrocnemius vein DVT, diagnosed on a duplex performed on 6/30/22. Mrs. Rojas reports that this study was obtained due to lab abnormalities noted when she presented with mild COVID-19 infection. She denies any prior history of thromboembolic disease. She denies any history of lower extremity spider or varicose veins, edema, skin pigmentation changes or ulcers. She is an active person and walks her dog daily and denies any symptoms of claudication, rest pain or tissue loss. \par \par She denies any history of CAD, MI, CHF, CVA, TIA, CRI, or DM\par \par PMH: hysterectomy, cataract surgery\par \par Medications: ophthalmic solutions, Advair, Benzonatate, and Temazepam\par \par All: Sulfa\par \par SH: non-smoker\par \par FH: NC

## 2022-08-01 NOTE — PHYSICAL EXAM
[de-identified] : On physical examination the patient is in no acute distress and neurologically intact. The lungs are clear to auscultation and the heart has a regular rate and rhythm. Abdomen is benign. Bilateral femoral and pedal pulses are palpable. No significant lower extremity edema, skin changes or wounds present.

## 2022-10-25 NOTE — ED PROVIDER NOTE - PROGRESS NOTE DETAILS
[facility] - Outpatient Rehabilitation & Therapy  [address]  Phone: [phone]   Fax:     [phone]      Date:  10/25/2022    Patient Name:  Donna Schmitt    :  1977  MRN: [id number]    Pertinent Medical History:      Medical/Treatment Diagnosis Information:  Medical Diagnosis: Oth tear of unsp meniscus, current injury, right knee, init [S83.203A]  Treatment Diagnosis: R knee pain, limited mobility/ function    Insurance/Certification information:     Physician Information:  Marianne Ro MD  Plan of care signed (Y/N):     Date of Patient follow up with Physician:      Progress Report: []  Yes  [x]  No     Date Range for reporting period:  Beginning: 10/25/2022  Ending:     Progress report due (10 Rx/or 30 days whichever is less): #22     Recertification due (POC duration/ or 90 days whichever is less):      Visit # Insurance/POC Allowable Auth Needed    / [place]: 30/yr []Yes    []No       Latex Allergy:  [x]NO      []YES  Preferred Language for Healthcare:   [x]English       []Other:    Functional Scale:      Date assessed: at eval  Test: FOTO  Score:    Pain level:  /10     History of Injury:Pt here for evaluation R knee pain. She states actually as of the past couple weeks, she has had little to no pain in her knee that she would consider bothersome. She even stated that she almost cancelled todays session because her problem isn't really bothering her at the moment. She is still agreeable to todays session because the doctors told her to come here and she did have severe knee pain last month, and she wants to know what she can do to prevent that from happening again. She reports the pain started in high school (in [place]) and she was told she had meniscus tears. She did not have any injury, but it would pop, she would receive periodic cortisone injections.  She had taken high dose ibuprofen and used biofreeze/ lidocaine patches to manage the pain. She attributes more pain during colder months and less pain during warmer months. Last summer she went to the park and regularly rode bikes with her kids and her knee felt good during that time. 3 yrs ago, used to live w/ mom (in [place]) who had no stairs. Now for past 3 years she has 10-15 stairs to go up stairs, and also basement stairs. Knee pain was actually doing well for the past few years, but last month, she had a severe flare up and went to ER. She was given pain meds and the pain significantly reduced after a few days. Now she has stopped the ibuprofen and hasn't had to use biofreeze or lidocaine patches and her knee hasn't been bothersome. She has taken the naproxen only 1x/wk because she hasn't felt a need to take it because her knee isn't bothersome. Activity:   -last summer rode bikes/ walked at local park (knee felt good then)  - has done football workouts w/ 15 y/o son (wall squats, push ups)    Pain pattern: variable depending on time of day. Denies any hip/ ankle/ back problems. 5 kids, high school age/ younger; lives w/ kids and boyfriend. Often works out at home w/ kids (football workouts: wall squats, push ups, etc). Sleep: avg 6-8 hours a night.       Problems:   - squatting (catchers squat)  - stairs (worse going up)    SUBJECTIVE:  See eval    OBJECTIVE:  Observation:   Test measurements:      RESTRICTIONS/PRECAUTIONS:     Exercises/Interventions:     Therapeutic Ex (17164)   Min: Reps/Resistance Notes/CUES        Chair squats x10    Lateral band walk Pt edu         Step up 6\" fwd x 10 Mild crepitus on R, but no pain reported   SLS >future         Manual Intervention (08647)  Min:     Knee mobs/PROM >prn    Tib/Fem Mobs     Patella Mobs     Ankle mobs               NMR re-education (32016)  Min:  CUES NEEDED             Therapeutic Activity (64637)  Min:     Pt edu Role of PT, pathology, thoroughly reviewed safe ex's and ones to avoid (anterior translation w/ squats)         Modalities  Min:     IFC with      CP after exercises     MH after exercises            Other Therapeutic Activities: Pt was educated on PT POC, Diagnosis, Prognosis, pathomechanics as well as frequency and duration of scheduling future physical therapy appointments. Time was also taken on this day to answer all patient questions and participation in PT. Reviewed appointment policy in detail with patient and patient verbalized understanding. Home Exercise Program: Patient was instructed in the following for HEP:     10/25: chair squats, lateral band walk, resume walking/ biking at park w/ family at least 2x/wk. . Patient verbalized/demonstrated understanding and was issued written handout. Therapeutic Exercise and NMR EXR  [x] (96492) Provided verbal/tactile cueing for activities related to strengthening, flexibility, endurance, ROM for improvements in LE, proximal hip, and core control with self care, mobility, lifting, ambulation. [x] (03185) Provided verbal/tactile cueing for activities related to improving balance, coordination, kinesthetic sense, posture, motor skill, proprioception  to assist with LE, proximal hip, and core control in self care, mobility, lifting, ambulation and eccentric single leg control. NMR and Therapeutic Activities:    [x] (91940 or 08401) Provided verbal/tactile cueing for activities related to improving balance, coordination, kinesthetic sense, posture, motor skill, proprioception and motor activation to allow for proper function of core, proximal hip and LE with self care and ADLs and functional mobility.    [x] (80953) Gait Re-education- Provided training and instruction to the patient for proper LE, core and proximal hip recruitment and positioning and eccentric body weight control with ambulation re-education including up and down stairs     Home Exercise Program:    [x] (59124) Reviewed/Progressed HEP activities related to strengthening, flexibility, endurance, ROM of core, proximal hip and LE for functional self-care, mobility, lifting and ambulation/stair navigation   [x] (32535)Reviewed/Progressed HEP activities related to improving balance, coordination, kinesthetic sense, posture, motor skill, proprioception of core, proximal hip and LE for self care, mobility, lifting, and ambulation/stair navigation      Manual Treatments:  PROM / STM / Oscillations-Mobs:  G-I, II, III, IV (PA's, Inf., Post.)  [x] (19683) Provided manual therapy to mobilize LE, proximal hip and/or LS spine soft tissue/joints for the purpose of modulating pain, promoting relaxation,  increasing ROM, reducing/eliminating soft tissue swelling/inflammation/restriction, improving soft tissue extensibility and allowing for proper ROM for normal function with self care, mobility, lifting and ambulation. Approval Dates:  CPT Code Units Approved Units Used  Date Updated:                     Charges:  Timed Code Treatment Minutes: 24   Total Treatment Minutes: 44      [x] EVAL (LOW) 19869 (typically 20 minutes face-to-face)  [] EVAL (MOD) 74464 (typically 30 minutes face-to-face)  [] EVAL (HIGH) 19866 (typically 45 minutes face-to-face)  [] RE-EVAL     [x] LJ(04981) x     [] Dry needle 1 or 2 Muscles (43851)  [] NMR (75935) x     [] Dry needle 3+ Muscles (31576)  [] Manual (25518) x     [] Ultrasound (51459) x  [x] TA (02796) x     [] Mech Traction (30327)  [] ES(attended) (73406)     [] ES (un) (92773):   [] Vasopump (75653) [] Ionto (97522)   [] Other:    Jamee Right stated goal: able to perform all daily activities w/o limiting knee pain. [] Progressing: [] Met: [] Not Met: [] Adjusted    Therapist goals for Patient:   Short Term Goals: To be achieved in: 2 weeks  1. Independent in HEP and progression per patient tolerance, in order to prevent re-injury. [] Progressing: [] Met: [] Not Met: [] Adjusted  2.  Patient will have a decrease in pain to facilitate improvement in movement, function, and ADLs as indicated by Functional Deficits. [] Progressing: [] Met: [] Not Met: [] Adjusted    Long Term Goals: To be achieved in: up to 10 weeks  1. Increase FOTO functional outcome score from 50 to 66  to assist with reaching prior level of function. [] Progressing: [] Met: [] Not Met: [] Adjusted  2. Patient will maintain WNL AROM of all planes to allow for proper joint functioning as indicated by patients Functional Deficits. [] Progressing: [] Met: [] Not Met: [] Adjusted  3. Patient will demonstrate an increase in Strength to good proximal hip strength and control, within 5lb HHD in LE to allow for proper functional mobility as indicated by patients Functional Deficits. [] Progressing: [] Met: [] Not Met: [] Adjusted  4. Patient will return to able to ambulate several flights of stairs a day w/ knee pain <3/10. [] Progressing: [] Met: [] Not Met: [] Adjusted  5. Pt will return to regular exercise: walking or biking with family at least 2x/wk for long-term wellness. [] Progressing: [] Met: [] Not Met: [] Adjusted     ASSESSMENT:  See eval    Treatment/Activity Tolerance:  [x] Patient tolerated treatment well [] Patient limited by fatique  [] Patient limited by pain  [] Patient limited by other medical complications  [] Other:     Overall Progression Towards Functional goals/ Treatment Progress Update:  [] Patient is progressing as expected towards functional goals listed. [] Progression is slowed due to complexities/Impairments listed. [] Progression has been slowed due to co-morbidities.   [x] Plan just implemented, too soon to assess goals progression <30days   [] Goals require adjustment due to lack of progress  [] Patient is not progressing as expected and requires additional follow up with physician  [] Other    Prognosis for POC: [x] Good [] Fair  [] Poor    Patient requires continued skilled intervention: [x] Yes  [] No PLAN:   [] Continue per plan of care [] Alter current plan (see comments)  [x] Plan of care initiated [] Hold pending MD visit [] Discharge    Electronically signed by: Lamon Fabry, PT , DPT  #[id number]      Note: If patient does not return for scheduled/recommended follow up visits, this note will serve as a discharge from care along with the most recent update on progress. Ashu Wills: Attempted Dr. Burris x2 with calls forwarded to voicemail. Logan Wills DO PGY-1: Spoke with Dr. Fatuma NAYAK on the phone, pt was in the office today with confusion, SPO2 91%, +COVID test, febrile with 10.4K WBC, b/l pulm infiltrates on CXR. Pt is currently A&Ox3, not hypoxic, amb sat 96%, not SOB with clear lungs. Dr. Burris recs admission. Agrees with paxlovid or remdesvir Logan Wills DO: Attempted Dr. Burris x2 with calls forwarded to voicemail. Ben Duarte MD (PGY1): Covid swab obtained. Vitals stable. No indication for hospitalization. Will d/c w/ option for MAB infusion as outpatient. return precautions discussed. Attending MD Andujar: Patient labs and CXR reviewed, nonactionable.  Discussed supportive care with patient.  Paxlovid discussed with son in law, no decision at time of discussion, will defer at this time.  Stable for discharge with MAB referral.  Follow up instructions given, importance of follow up emphasized, return to ED parameters reviewed and patient verbalized understanding.  All questions answered, all concerns addressed.

## 2023-08-11 NOTE — DISCHARGE NOTE NURSING/CASE MANAGEMENT/SOCIAL WORK - FLU SEASON?
"Spoke with patient and she thinks that her "hymen" is women. Says that she can feel it between her legs. Has an appt scheduled with Dr Franklin on Tuesday.  "
----- Message from Romario Harris sent at 8/11/2023  8:43 AM CDT -----  Contact: Self  Type: Needs Medical Advice  Who Called:  Patient  Best Call Back Number: [phone]    Additional Information: Called to speak with office regarding symptoms that came up this morning. Would not discuss over phone. Please call.         
Yes...

## 2024-01-26 NOTE — PATIENT PROFILE ADULT - FUNCTIONAL ASSESSMENT - BASIC MOBILITY SECTION LABEL
Left message to confirm stress test for 1/29/2024 at 0730 am. Medications and instructions reviewed.Patient  encouraged to bring albuterol inhaler and to call with any questions or concerns.    .

## 2024-06-12 ENCOUNTER — APPOINTMENT (OUTPATIENT)
Dept: SURGERY | Facility: CLINIC | Age: 84
End: 2024-06-12
Payer: COMMERCIAL

## 2024-06-12 PROCEDURE — 99205K: CUSTOM

## 2024-07-01 ENCOUNTER — OUTPATIENT (OUTPATIENT)
Dept: OUTPATIENT SERVICES | Facility: HOSPITAL | Age: 84
LOS: 1 days | End: 2024-07-01

## 2024-07-01 ENCOUNTER — OUTPATIENT (OUTPATIENT)
Dept: OUTPATIENT SERVICES | Facility: HOSPITAL | Age: 84
LOS: 1 days | End: 2024-07-01
Payer: COMMERCIAL

## 2024-07-01 ENCOUNTER — APPOINTMENT (OUTPATIENT)
Dept: ULTRASOUND IMAGING | Facility: IMAGING CENTER | Age: 84
End: 2024-07-01
Payer: COMMERCIAL

## 2024-07-01 VITALS
HEART RATE: 80 BPM | OXYGEN SATURATION: 95 % | SYSTOLIC BLOOD PRESSURE: 121 MMHG | WEIGHT: 139.99 LBS | HEIGHT: 61 IN | RESPIRATION RATE: 16 BRPM | TEMPERATURE: 97 F | DIASTOLIC BLOOD PRESSURE: 64 MMHG

## 2024-07-01 DIAGNOSIS — C50.412 MALIGNANT NEOPLASM OF UPPER-OUTER QUADRANT OF LEFT FEMALE BREAST: ICD-10-CM

## 2024-07-01 DIAGNOSIS — C43.59 MALIGNANT MELANOMA OF OTHER PART OF TRUNK: Chronic | ICD-10-CM

## 2024-07-01 DIAGNOSIS — Z98.49 CATARACT EXTRACTION STATUS, UNSPECIFIED EYE: Chronic | ICD-10-CM

## 2024-07-01 DIAGNOSIS — Z90.89 ACQUIRED ABSENCE OF OTHER ORGANS: Chronic | ICD-10-CM

## 2024-07-01 DIAGNOSIS — Z00.8 ENCOUNTER FOR OTHER GENERAL EXAMINATION: ICD-10-CM

## 2024-07-01 DIAGNOSIS — Z90.710 ACQUIRED ABSENCE OF BOTH CERVIX AND UTERUS: Chronic | ICD-10-CM

## 2024-07-01 PROCEDURE — A4648: CPT

## 2024-07-01 PROCEDURE — 19285 PERQ DEV BREAST 1ST US IMAG: CPT

## 2024-07-01 PROCEDURE — 19285 PERQ DEV BREAST 1ST US IMAG: CPT | Mod: LT

## 2024-07-01 RX ORDER — DEXTROSE MONOHYDRATE AND SODIUM CHLORIDE 5; .3 G/100ML; G/100ML
1000 INJECTION, SOLUTION INTRAVENOUS
Refills: 0 | Status: DISCONTINUED | OUTPATIENT
Start: 2024-07-09 | End: 2024-07-23

## 2024-07-08 NOTE — ASU PATIENT PROFILE, ADULT - FALL HARM RISK - UNIVERSAL INTERVENTIONS
Bed in lowest position, wheels locked, appropriate side rails in place/Call bell, personal items and telephone in reach/Instruct patient to call for assistance before getting out of bed or chair/Non-slip footwear when patient is out of bed/[place] to call system/Physically safe environment - no spills, clutter or unnecessary equipment/Purposeful Proactive Rounding/Room/bathroom lighting operational, light cord in reach

## 2024-07-08 NOTE — ASU PATIENT PROFILE, ADULT - VISION (WITH CORRECTIVE LENSES IF THE PATIENT USUALLY WEARS THEM):
Reading glassess/Normal vision: sees adequately in most situations; can see medication labels, newsprint

## 2024-07-08 NOTE — ASU PATIENT PROFILE, ADULT - NSICDXPASTMEDICALHX_GEN_ALL_CORE_FT
PAST MEDICAL HISTORY:  Glaucoma     [employer] [place] (hard of hearing)     Malignant neoplasm of upper-outer quadrant of left female breast     Right leg DVT

## 2024-07-08 NOTE — ASU PATIENT PROFILE, ADULT - NSICDXPASTSURGICALHX_GEN_ALL_CORE_FT
PAST SURGICAL HISTORY:  Melanoma of back S/p removal    S/P hysterectomy     S/P tonsillectomy     Status post cataract extraction OD

## 2024-07-09 ENCOUNTER — OUTPATIENT (OUTPATIENT)
Dept: OUTPATIENT SERVICES | Facility: HOSPITAL | Age: 84
LOS: 1 days | Discharge: HOME | End: 2024-07-09
Payer: COMMERCIAL

## 2024-07-09 ENCOUNTER — TRANSCRIPTION ENCOUNTER (OUTPATIENT)
Age: 84
End: 2024-07-09

## 2024-07-09 ENCOUNTER — APPOINTMENT (OUTPATIENT)
Dept: SURGERY | Facility: HOSPITAL | Age: 84
End: 2024-07-09

## 2024-07-09 VITALS — HEIGHT: 61 IN | WEIGHT: 139.99 LBS

## 2024-07-09 VITALS
SYSTOLIC BLOOD PRESSURE: 142 MMHG | RESPIRATION RATE: 16 BRPM | HEART RATE: 65 BPM | DIASTOLIC BLOOD PRESSURE: 80 MMHG | OXYGEN SATURATION: 99 %

## 2024-07-09 DIAGNOSIS — C50.412 MALIGNANT NEOPLASM OF UPPER-OUTER QUADRANT OF LEFT FEMALE BREAST: ICD-10-CM

## 2024-07-09 DIAGNOSIS — Z90.710 ACQUIRED ABSENCE OF BOTH CERVIX AND UTERUS: Chronic | ICD-10-CM

## 2024-07-09 DIAGNOSIS — Z90.89 ACQUIRED ABSENCE OF OTHER ORGANS: Chronic | ICD-10-CM

## 2024-07-09 DIAGNOSIS — C43.59 MALIGNANT MELANOMA OF OTHER PART OF TRUNK: Chronic | ICD-10-CM

## 2024-07-09 DIAGNOSIS — Z98.49 CATARACT EXTRACTION STATUS, UNSPECIFIED EYE: Chronic | ICD-10-CM

## 2024-07-09 PROCEDURE — 76098 X-RAY EXAM SURGICAL SPECIMEN: CPT | Mod: 26

## 2024-07-09 PROCEDURE — 88360 TUMOR IMMUNOHISTOCHEM/MANUAL: CPT | Mod: 26

## 2024-07-09 PROCEDURE — 19301K: CUSTOM | Mod: LT

## 2024-07-09 PROCEDURE — 88307 TISSUE EXAM BY PATHOLOGIST: CPT | Mod: 26

## 2024-07-09 PROCEDURE — 88305 TISSUE EXAM BY PATHOLOGIST: CPT | Mod: 26

## 2024-07-09 RX ORDER — TEMAZEPAM 30 MG/1
1 CAPSULE ORAL
Refills: 0 | DISCHARGE

## 2024-07-09 NOTE — ASU DISCHARGE PLAN (ADULT/PEDIATRIC) - CARE PROVIDER_API CALL
Joan Neal  Surgical Oncology  [address], [address]  [place] [address]  Phone: [phone]  Fax: [phone]  Established Patient  Follow Up Time:

## 2024-07-11 LAB — SURGICAL PATHOLOGY STUDY: SIGNIFICANT CHANGE UP

## 2024-07-12 PROBLEM — H91.90 UNSPECIFIED HEARING LOSS, UNSPECIFIED EAR: Chronic | Status: ACTIVE | Noted: 2024-07-01

## 2024-07-12 PROBLEM — I82.401 ACUTE EMBOLISM AND THROMBOSIS OF UNSPECIFIED DEEP VEINS OF RIGHT LOWER EXTREMITY: Chronic | Status: ACTIVE | Noted: 2024-07-01

## 2024-07-12 PROBLEM — C50.412 MALIGNANT NEOPLASM OF UPPER-OUTER QUADRANT OF LEFT FEMALE BREAST: Chronic | Status: ACTIVE | Noted: 2024-07-01

## 2024-07-15 ENCOUNTER — APPOINTMENT (OUTPATIENT)
Dept: SURGERY | Facility: CLINIC | Age: 84
End: 2024-07-15
Payer: COMMERCIAL

## 2024-07-15 PROCEDURE — 99024 POSTOP FOLLOW-UP VISIT: CPT

## 2024-07-27 ENCOUNTER — OUTPATIENT (OUTPATIENT)
Dept: OUTPATIENT SERVICES | Facility: HOSPITAL | Age: 84
LOS: 1 days | Discharge: HOME | End: 2024-07-27

## 2024-07-27 DIAGNOSIS — Z90.710 ACQUIRED ABSENCE OF BOTH CERVIX AND UTERUS: Chronic | ICD-10-CM

## 2024-07-27 DIAGNOSIS — C50.919 MALIGNANT NEOPLASM OF UNSPECIFIED SITE OF UNSPECIFIED FEMALE BREAST: ICD-10-CM

## 2024-07-27 DIAGNOSIS — C43.59 MALIGNANT MELANOMA OF OTHER PART OF TRUNK: Chronic | ICD-10-CM

## 2024-07-27 DIAGNOSIS — Z98.49 CATARACT EXTRACTION STATUS, UNSPECIFIED EYE: Chronic | ICD-10-CM

## 2024-07-27 DIAGNOSIS — Z90.89 ACQUIRED ABSENCE OF OTHER ORGANS: Chronic | ICD-10-CM

## 2024-07-31 ENCOUNTER — APPOINTMENT (OUTPATIENT)
Dept: HEMATOLOGY ONCOLOGY | Facility: CLINIC | Age: 84
End: 2024-07-31
Payer: COMMERCIAL

## 2024-07-31 DIAGNOSIS — R41.3 OTHER AMNESIA: ICD-10-CM

## 2024-07-31 DIAGNOSIS — H40.2230 CHRONIC ANGLE-CLOSURE GLAUCOMA, BILATERAL, STAGE UNSPECIFIED: ICD-10-CM

## 2024-07-31 DIAGNOSIS — C50.912 MALIGNANT NEOPLASM OF UNSPECIFIED SITE OF LEFT FEMALE BREAST: ICD-10-CM

## 2024-07-31 DIAGNOSIS — Z80.3 FAMILY HISTORY OF MALIGNANT NEOPLASM OF BREAST: ICD-10-CM

## 2024-07-31 DIAGNOSIS — Z17.0 MALIGNANT NEOPLASM OF UNSPECIFIED SITE OF LEFT FEMALE BREAST: ICD-10-CM

## 2024-07-31 DIAGNOSIS — H91.90 UNSPECIFIED HEARING LOSS, UNSPECIFIED EAR: ICD-10-CM

## 2024-07-31 DIAGNOSIS — Z78.9 OTHER SPECIFIED HEALTH STATUS: ICD-10-CM

## 2024-07-31 PROCEDURE — G2211 COMPLEX E/M VISIT ADD ON: CPT | Mod: NC

## 2024-07-31 PROCEDURE — 99205 OFFICE O/P NEW HI 60 MIN: CPT

## 2024-07-31 RX ORDER — TEMAZEPAM 15 MG/1
15 CAPSULE ORAL
Refills: 0 | Status: ACTIVE | COMMUNITY
Start: 2024-07-31

## 2024-07-31 RX ORDER — ANASTROZOLE TABLETS 1 MG/1
1 TABLET ORAL
Qty: 90 | Refills: 1 | Status: ACTIVE | COMMUNITY
Start: 2024-07-31 | End: 1900-01-01

## 2024-07-31 RX ORDER — COLD-HOT PACK
EACH MISCELLANEOUS DAILY
Refills: 0 | Status: ACTIVE | COMMUNITY
Start: 2024-07-31

## 2024-07-31 RX ORDER — BIMATOPROST 0.1 MG/ML
0.01 SOLUTION/ DROPS OPHTHALMIC DAILY
Refills: 0 | Status: ACTIVE | COMMUNITY
Start: 2024-07-31

## 2024-08-01 PROBLEM — C50.912 MALIGNANT NEOPLASM OF LEFT BREAST IN FEMALE, ESTROGEN RECEPTOR POSITIVE, UNSPECIFIED SITE OF BREAST: Status: ACTIVE | Noted: 2024-07-31

## 2024-08-01 PROBLEM — R41.3 MEMORY DIFFICULTIES: Status: ACTIVE | Noted: 2024-08-01

## 2024-08-01 NOTE — PHYSICAL EXAM
[Restricted in physically strenuous activity but ambulatory and able to carry out work of a light or sedentary nature] : Status 1- Restricted in physically strenuous activity but ambulatory and able to carry out work of a light or sedentary nature, e.g., light house work, office work [Normal] : affect appropriate [de-identified] : Well healed incision left breast. Large left breast seroma with no erythema or warmth

## 2024-08-01 NOTE — HISTORY OF PRESENT ILLNESS
[Disease: _____________________] : Disease: [unfilled] [T: ___] : T[unfilled] [N: ___] : N[unfilled] [M: ___] : M[unfilled] [AJCC Stage: ____] : AJCC Stage: [unfilled] [de-identified] : Left breast cancer diagnosed at age 84 6/10/24 Bilateral mammogram and sonogram revealed a 5 mm mass with spiculated margins in the left breast at 2-3 o'clock. 6/10/24 Stereotactic guided core biopsy of the left breast revealed moderately differentiated IDC, ER/%, HER-2 (1+) negative. 7/9/24 Left lumpectomy by Dr. Neal revealed a 5 mm IDC, SBR 6/9, with negative margins [de-identified] : 5 mm IDC, SBR 6/9, ER/%, HER-2 (1+) negative [de-identified] : Maci comes to discuss the medical management of her newly diagnosed breast cancer.  HEALTH MAINTENANCE: PCP: Dr. Yassine Garcia GYN: none Mammogram & sonogram: 6/10/24 Pap Smear: not current Bone density: will get results Colonoscopy: 2019 no polyps

## 2024-08-01 NOTE — CONSULT LETTER
[Dear  ___] : Dear  [unfilled], [( Thank you for referring [unfilled] for consultation for _____ )] : Thank you for referring [unfilled] for consultation for [unfilled] [Please see my note below.] : Please see my note below. [Consult Closing:] : Thank you very much for allowing me to participate in the care of this patient.  If you have any questions, please do not hesitate to contact me. [Sincerely,] : Sincerely, [DrJohn  ___] : Dr. OKEEFE [FreeTextEntry2] : Joan Neal MD [address] [place], [place] [id number] [FreeTextEntry3] : Erin Tripp MD Associate Chief  [facility] [place]  of Medicine [facility]

## 2024-08-01 NOTE — HISTORY OF PRESENT ILLNESS
[Disease: _____________________] : Disease: [unfilled] [T: ___] : T[unfilled] [N: ___] : N[unfilled] [M: ___] : M[unfilled] [AJCC Stage: ____] : AJCC Stage: [unfilled] [de-identified] : Left breast cancer diagnosed at age 84 6/10/24 Bilateral mammogram and sonogram revealed a 5 mm mass with spiculated margins in the left breast at 2-3 o'clock. 6/10/24 Stereotactic guided core biopsy of the left breast revealed moderately differentiated IDC, ER/%, HER-2 (1+) negative. 7/9/24 Left lumpectomy by Dr. Neal revealed a 5 mm IDC, SBR 6/9, with negative margins [de-identified] : 5 mm IDC, SBR 6/9, ER/%, HER-2 (1+) negative [de-identified] : Maci comes to discuss the medical management of her newly diagnosed breast cancer.  HEALTH MAINTENANCE: PCP: Dr. Yassine Garcia GYN: none Mammogram & sonogram: 6/10/24 Pap Smear: not current Bone density: will get results Colonoscopy: 2019 no polyps

## 2024-08-01 NOTE — CONSULT LETTER
[Dear  ___] : Dear  [unfilled], [( Thank you for referring [unfilled] for consultation for _____ )] : Thank you for referring [unfilled] for consultation for [unfilled] [Please see my note below.] : Please see my note below. [Consult Closing:] : Thank you very much for allowing me to participate in the care of this patient.  If you have any questions, please do not hesitate to contact me. [Sincerely,] : Sincerely, [DrJohn  ___] : Dr. OKEEFE [FreeTextEntry2] : Joan Neal MD [address] [place], [place] [id number] [FreeTextEntry3] : Erin Tripp MD Associate Chief  [facility] [place]  of Medicine [facility]

## 2024-08-01 NOTE — PHYSICAL EXAM
[Restricted in physically strenuous activity but ambulatory and able to carry out work of a light or sedentary nature] : Status 1- Restricted in physically strenuous activity but ambulatory and able to carry out work of a light or sedentary nature, e.g., light house work, office work [Normal] : affect appropriate [de-identified] : Well healed incision left breast. Large left breast seroma with no erythema or warmth

## 2024-10-11 NOTE — ASU DISCHARGE PLAN (ADULT/PEDIATRIC). - DO NOT DRIVE OR OPERATE MACHINERY
Subjective   Patient ID:  Manuela Reyes is a 76 y.o. female who presents for follow-up of hyperlipidemia    HPI  Ms. Reyes is a 76 year old female patient whose current medical conditions include HTN, hyperlipidemia, prior TIA, mesenteric artery stenosis, and Crohn's disease who presents today for routine follow-up. She returns today and states she is doing clinically well. Main issue is right-sided rib pain, worse with bending/certain movements. No real CV complaints. Denies any chest pain, heaviness, or tightness. No postprandial pain or signs/symptoms of mesenteric ischemia. No s/s suggestive of CHF. No LH, dizziness, palpitations, near syncope, or syncope. BP stable and controlled. Patient is compliant with her medications. Very active, takes care of her ADL's  and her sister. Walks 2-3 miles every other day at the [facility]. Trying to do better.with her diet/fatty foods. She is compliant with her medications. Unable to tolerate statin/Repatha due to severe myalgias.     Review of Systems   Constitutional: Negative for chills, decreased appetite, fever and malaise/fatigue.   HENT:  Negative for congestion, hoarse voice and sore throat.    Eyes:  Negative for blurred vision and discharge.   Cardiovascular:  Negative for chest pain, claudication, cyanosis, dyspnea on exertion, irregular heartbeat, leg swelling, near-syncope, orthopnea, palpitations and paroxysmal nocturnal dyspnea.   Respiratory:  Negative for cough, hemoptysis, shortness of breath, snoring, sputum production and wheezing.    Endocrine: Negative for cold intolerance and heat intolerance.   Hematologic/Lymphatic: Negative for bleeding problem. Does not bruise/bleed easily.   Skin:  Negative for rash.   Musculoskeletal:  Positive for arthritis and joint pain (rib). Negative for back pain, joint swelling, muscle cramps, muscle weakness and myalgias.   Gastrointestinal:  Negative for abdominal pain, constipation, diarrhea, heartburn, melena  "and nausea.   Genitourinary:  Negative for hematuria.   Neurological:  Negative for dizziness, focal weakness, headaches, light-headedness, loss of balance, numbness, paresthesias, seizures and weakness.   Psychiatric/Behavioral:  Negative for memory loss. The patient does not have insomnia.    Allergic/Immunologic: Negative for hives.     /70   Pulse 72   Ht 5' 8" (1.727 m)   Wt 50.6 kg (111 lb 8.8 oz)   SpO2 98%   BMI 16.96 kg/m²          Objective     Physical Exam  Vitals and nursing note reviewed.   Constitutional:       General: She is not in acute distress.     Appearance: Normal appearance. She is well-developed. She is not diaphoretic.   HENT:      Head: Normocephalic and atraumatic.   Eyes:      General:         Right eye: No discharge.         Left eye: No discharge.      Pupils: Pupils are equal, round, and reactive to light.   Cardiovascular:      Rate and Rhythm: Normal rate and regular rhythm.      Heart sounds: Normal heart sounds, S1 normal and S2 normal. No murmur heard.  Pulmonary:      Effort: Pulmonary effort is normal. No respiratory distress.      Breath sounds: Normal breath sounds.   Abdominal:      General: There is no distension.   Musculoskeletal:      Right lower leg: No edema.      Left lower leg: No edema.   Skin:     General: Skin is warm and dry.      Findings: No erythema.   Neurological:      General: No focal deficit present.      Mental Status: She is alert and oriented to person, place, and time.   Psychiatric:         Mood and Affect: Mood normal.         Behavior: Behavior normal.           Chemistry        Component Value Date/Time     (L) 08/19/2024 1120    K 5.0 08/19/2024 1120    CL 98 08/19/2024 1120    CO2 24 08/19/2024 1120    BUN 16 08/19/2024 1120    CREATININE 0.8 08/29/2024 1033     08/19/2024 1120        Component Value Date/Time    CALCIUM 9.9 08/19/2024 1120    ALKPHOS 39 (L) 08/19/2024 1120    AST 20 08/19/2024 1120    ALT 14 08/19/2024 " 1120    BILITOT 0.5 08/19/2024 1120    ESTGFRAFRICA >60 06/14/2022 1144    EGFRNONAA >60 06/14/2022 1144        Lab Results   Component Value Date    CHOL 193 07/18/2024    CHOL 177 08/18/2023    CHOL 210 (H) 03/09/2023     Lab Results   Component Value Date    HDL 90 (H) 07/18/2024    HDL 80 (H) 08/18/2023    HDL 83 (H) 03/09/2023     Lab Results   Component Value Date    LDLCALC 90.6 07/18/2024    LDLCALC 87.8 08/18/2023    LDLCALC 114.4 03/09/2023     Lab Results   Component Value Date    TRIG 62 07/18/2024    TRIG 46 08/18/2023    TRIG 63 03/09/2023       Lab Results   Component Value Date    CHOLHDL 46.6 07/18/2024    CHOLHDL 45.2 08/18/2023    CHOLHDL 39.5 03/09/2023            Assessment and Plan     1. Atherosclerosis of abdominal aorta    2. Essential hypertension    3. Family history of cardiovascular disease    4. Hyperlipidemia LDL goal <70    5. Crohn's disease of colon with complication    6. Mesenteric artery stenosis    7. Calcification of aorta      Doing well. Stable CV wise. No CP/angina. Some left-sided rib pain that onset after lifting a cattle gate, improving with Ibuprofen. Will discuss mesenteric imaging with Dr. Muñoz. Counseled on low fat/chol diet, will repeat lipid panel in January with scheduled lab work.   Plan:  -Lipid panel in January, phone review  -Continue same CV meds/mgmt  -Consider ? Zetia  -Continue low fat/low chol diet  -RTC 6 months with Dr. Muñoz  -Will discuss if we need to re-image her abdomen           Statement Selected

## 2024-11-22 ENCOUNTER — OUTPATIENT (OUTPATIENT)
Dept: OUTPATIENT SERVICES | Facility: HOSPITAL | Age: 84
LOS: 1 days | Discharge: HOME | End: 2024-11-22

## 2024-11-22 DIAGNOSIS — C50.919 MALIGNANT NEOPLASM OF UNSPECIFIED SITE OF UNSPECIFIED FEMALE BREAST: ICD-10-CM

## 2024-11-22 DIAGNOSIS — Z90.710 ACQUIRED ABSENCE OF BOTH CERVIX AND UTERUS: Chronic | ICD-10-CM

## 2024-11-22 DIAGNOSIS — Z90.89 ACQUIRED ABSENCE OF OTHER ORGANS: Chronic | ICD-10-CM

## 2024-11-22 DIAGNOSIS — C43.59 MALIGNANT MELANOMA OF OTHER PART OF TRUNK: Chronic | ICD-10-CM

## 2024-11-22 DIAGNOSIS — Z98.49 CATARACT EXTRACTION STATUS, UNSPECIFIED EYE: Chronic | ICD-10-CM

## 2024-11-26 ENCOUNTER — APPOINTMENT (OUTPATIENT)
Dept: HEMATOLOGY ONCOLOGY | Facility: CLINIC | Age: 84
End: 2024-11-26

## 2024-12-25 PROBLEM — F10.90 ALCOHOL USE: Status: ACTIVE | Noted: 2024-07-31

## 2025-01-27 NOTE — ASSESSMENT
[Curative] : Goals of care discussed with patient: Curative
[Curative] : Goals of care discussed with patient: Curative
spontaneous/unlabored

## 2025-05-22 ENCOUNTER — OUTPATIENT (OUTPATIENT)
Dept: OUTPATIENT SERVICES | Facility: HOSPITAL | Age: 85
LOS: 1 days | Discharge: HOME | End: 2025-05-22

## 2025-05-22 DIAGNOSIS — Z98.49 CATARACT EXTRACTION STATUS, UNSPECIFIED EYE: Chronic | ICD-10-CM

## 2025-05-22 DIAGNOSIS — C43.59 MALIGNANT MELANOMA OF OTHER PART OF TRUNK: Chronic | ICD-10-CM

## 2025-05-22 DIAGNOSIS — Z90.710 ACQUIRED ABSENCE OF BOTH CERVIX AND UTERUS: Chronic | ICD-10-CM

## 2025-05-22 DIAGNOSIS — Z90.89 ACQUIRED ABSENCE OF OTHER ORGANS: Chronic | ICD-10-CM

## 2025-05-27 ENCOUNTER — APPOINTMENT (OUTPATIENT)
Dept: HEMATOLOGY ONCOLOGY | Facility: CLINIC | Age: 85
End: 2025-05-27

## 2025-06-17 ENCOUNTER — NON-APPOINTMENT (OUTPATIENT)
Age: 85
End: 2025-06-17

## 2025-06-19 ENCOUNTER — APPOINTMENT (OUTPATIENT)
Dept: HEMATOLOGY ONCOLOGY | Facility: CLINIC | Age: 85
End: 2025-06-19
Payer: COMMERCIAL

## 2025-06-19 VITALS
TEMPERATURE: 98 F | RESPIRATION RATE: 17 BRPM | WEIGHT: 162.24 LBS | DIASTOLIC BLOOD PRESSURE: 82 MMHG | SYSTOLIC BLOOD PRESSURE: 159 MMHG | HEART RATE: 87 BPM | OXYGEN SATURATION: 93 % | BODY MASS INDEX: 30.66 KG/M2

## 2025-06-19 PROCEDURE — G2211 COMPLEX E/M VISIT ADD ON: CPT | Mod: NC

## 2025-06-19 PROCEDURE — 99214 OFFICE O/P EST MOD 30 MIN: CPT

## 2025-07-03 ENCOUNTER — RX RENEWAL (OUTPATIENT)
Age: 85
End: 2025-07-03

## (undated) DEVICE — ELCTR GROUNDING PAD ADULT COVIDIEN

## (undated) DEVICE — DRSG CURITY GAUZE SPONGE 4 X 4" 12-PLY

## (undated) DEVICE — ELCTR BOVIE PENCIL SMOKE EVACUATION

## (undated) DEVICE — GLV 6 PROTEXIS (WHITE)

## (undated) DEVICE — POSITIONER STRAP ARMBOARD VELCRO TS-30

## (undated) DEVICE — DRSG COMBINE 5X9"

## (undated) DEVICE — SUT MONOCRYL 4-0 27" PS-2 UNDYED

## (undated) DEVICE — PACK BREAST MINOR

## (undated) DEVICE — SPECIMEN CONTAINER 100ML

## (undated) DEVICE — GLV 6.5 PROTEXIS (WHITE)